# Patient Record
Sex: FEMALE | Race: WHITE | NOT HISPANIC OR LATINO | URBAN - METROPOLITAN AREA
[De-identification: names, ages, dates, MRNs, and addresses within clinical notes are randomized per-mention and may not be internally consistent; named-entity substitution may affect disease eponyms.]

---

## 2018-07-04 ENCOUNTER — EMERGENCY (EMERGENCY)
Facility: HOSPITAL | Age: 68
LOS: 0 days | Discharge: HOME | End: 2018-07-04
Attending: EMERGENCY MEDICINE | Admitting: EMERGENCY MEDICINE

## 2018-07-04 VITALS
WEIGHT: 138.01 LBS | HEART RATE: 62 BPM | HEIGHT: 61 IN | OXYGEN SATURATION: 100 % | SYSTOLIC BLOOD PRESSURE: 179 MMHG | TEMPERATURE: 97 F | RESPIRATION RATE: 18 BRPM | DIASTOLIC BLOOD PRESSURE: 80 MMHG

## 2018-07-04 VITALS
OXYGEN SATURATION: 100 % | DIASTOLIC BLOOD PRESSURE: 84 MMHG | HEART RATE: 69 BPM | RESPIRATION RATE: 18 BRPM | TEMPERATURE: 96 F | SYSTOLIC BLOOD PRESSURE: 148 MMHG

## 2018-07-04 DIAGNOSIS — Z98.890 OTHER SPECIFIED POSTPROCEDURAL STATES: Chronic | ICD-10-CM

## 2018-07-04 DIAGNOSIS — N39.0 URINARY TRACT INFECTION, SITE NOT SPECIFIED: ICD-10-CM

## 2018-07-04 DIAGNOSIS — F41.9 ANXIETY DISORDER, UNSPECIFIED: ICD-10-CM

## 2018-07-04 DIAGNOSIS — Z79.899 OTHER LONG TERM (CURRENT) DRUG THERAPY: ICD-10-CM

## 2018-07-04 DIAGNOSIS — R42 DIZZINESS AND GIDDINESS: ICD-10-CM

## 2018-07-04 DIAGNOSIS — R53.83 OTHER FATIGUE: ICD-10-CM

## 2018-07-04 LAB
ALBUMIN SERPL ELPH-MCNC: 4.4 G/DL — SIGNIFICANT CHANGE UP (ref 3.5–5.2)
ALP SERPL-CCNC: 91 U/L — SIGNIFICANT CHANGE UP (ref 30–115)
ALT FLD-CCNC: 20 U/L — SIGNIFICANT CHANGE UP (ref 0–41)
ANION GAP SERPL CALC-SCNC: 15 MMOL/L — HIGH (ref 7–14)
APPEARANCE UR: CLEAR — SIGNIFICANT CHANGE UP
AST SERPL-CCNC: 32 U/L — SIGNIFICANT CHANGE UP (ref 0–41)
BACTERIA # UR AUTO: (no result)
BASOPHILS # BLD AUTO: 0.06 K/UL — SIGNIFICANT CHANGE UP (ref 0–0.2)
BASOPHILS NFR BLD AUTO: 0.4 % — SIGNIFICANT CHANGE UP (ref 0–1)
BILIRUB SERPL-MCNC: 0.4 MG/DL — SIGNIFICANT CHANGE UP (ref 0.2–1.2)
BILIRUB UR-MCNC: NEGATIVE — SIGNIFICANT CHANGE UP
BUN SERPL-MCNC: 13 MG/DL — SIGNIFICANT CHANGE UP (ref 10–20)
CALCIUM SERPL-MCNC: 9.8 MG/DL — SIGNIFICANT CHANGE UP (ref 8.5–10.1)
CHLORIDE SERPL-SCNC: 103 MMOL/L — SIGNIFICANT CHANGE UP (ref 98–110)
CK SERPL-CCNC: 107 U/L — SIGNIFICANT CHANGE UP (ref 0–225)
CO2 SERPL-SCNC: 25 MMOL/L — SIGNIFICANT CHANGE UP (ref 17–32)
COLOR SPEC: YELLOW — SIGNIFICANT CHANGE UP
CREAT SERPL-MCNC: 0.8 MG/DL — SIGNIFICANT CHANGE UP (ref 0.7–1.5)
DIFF PNL FLD: (no result)
EOSINOPHIL # BLD AUTO: 0.05 K/UL — SIGNIFICANT CHANGE UP (ref 0–0.7)
EOSINOPHIL NFR BLD AUTO: 0.4 % — SIGNIFICANT CHANGE UP (ref 0–8)
GLUCOSE SERPL-MCNC: 120 MG/DL — HIGH (ref 70–99)
GLUCOSE UR QL: NEGATIVE MG/DL — SIGNIFICANT CHANGE UP
HCT VFR BLD CALC: 40.1 % — SIGNIFICANT CHANGE UP (ref 37–47)
HGB BLD-MCNC: 13.2 G/DL — SIGNIFICANT CHANGE UP (ref 12–16)
IMM GRANULOCYTES NFR BLD AUTO: 0.5 % — HIGH (ref 0.1–0.3)
KETONES UR-MCNC: NEGATIVE — SIGNIFICANT CHANGE UP
LACTATE SERPL-SCNC: 1.8 MMOL/L — SIGNIFICANT CHANGE UP (ref 0.5–2.2)
LEUKOCYTE ESTERASE UR-ACNC: SIGNIFICANT CHANGE UP
LYMPHOCYTES # BLD AUTO: 1.79 K/UL — SIGNIFICANT CHANGE UP (ref 1.2–3.4)
LYMPHOCYTES # BLD AUTO: 12.7 % — LOW (ref 20.5–51.1)
MAGNESIUM SERPL-MCNC: 1.9 MG/DL — SIGNIFICANT CHANGE UP (ref 1.8–2.4)
MCHC RBC-ENTMCNC: 30.1 PG — SIGNIFICANT CHANGE UP (ref 27–31)
MCHC RBC-ENTMCNC: 32.9 G/DL — SIGNIFICANT CHANGE UP (ref 32–37)
MCV RBC AUTO: 91.6 FL — SIGNIFICANT CHANGE UP (ref 81–99)
MONOCYTES # BLD AUTO: 0.76 K/UL — HIGH (ref 0.1–0.6)
MONOCYTES NFR BLD AUTO: 5.4 % — SIGNIFICANT CHANGE UP (ref 1.7–9.3)
NEUTROPHILS # BLD AUTO: 11.4 K/UL — HIGH (ref 1.4–6.5)
NEUTROPHILS NFR BLD AUTO: 80.6 % — HIGH (ref 42.2–75.2)
NITRITE UR-MCNC: POSITIVE
NRBC # BLD: 0 /100 WBCS — SIGNIFICANT CHANGE UP (ref 0–0)
PH UR: 6.5 — SIGNIFICANT CHANGE UP (ref 5–8)
PLATELET # BLD AUTO: 281 K/UL — SIGNIFICANT CHANGE UP (ref 130–400)
POTASSIUM SERPL-MCNC: 4.9 MMOL/L — SIGNIFICANT CHANGE UP (ref 3.5–5)
POTASSIUM SERPL-SCNC: 4.9 MMOL/L — SIGNIFICANT CHANGE UP (ref 3.5–5)
PROT SERPL-MCNC: 7.5 G/DL — SIGNIFICANT CHANGE UP (ref 6–8)
PROT UR-MCNC: NEGATIVE MG/DL — SIGNIFICANT CHANGE UP
RBC # BLD: 4.38 M/UL — SIGNIFICANT CHANGE UP (ref 4.2–5.4)
RBC # FLD: 11.9 % — SIGNIFICANT CHANGE UP (ref 11.5–14.5)
RBC CASTS # UR COMP ASSIST: (no result) /HPF
SODIUM SERPL-SCNC: 143 MMOL/L — SIGNIFICANT CHANGE UP (ref 135–146)
SP GR SPEC: 1.01 — SIGNIFICANT CHANGE UP (ref 1.01–1.03)
TROPONIN T SERPL-MCNC: <0.01 NG/ML — SIGNIFICANT CHANGE UP
UROBILINOGEN FLD QL: 0.2 MG/DL — SIGNIFICANT CHANGE UP (ref 0.2–0.2)
WBC # BLD: 14.13 K/UL — HIGH (ref 4.8–10.8)
WBC # FLD AUTO: 14.13 K/UL — HIGH (ref 4.8–10.8)
WBC UR QL: (no result) /HPF

## 2018-07-04 RX ORDER — CEFTRIAXONE 500 MG/1
1 INJECTION, POWDER, FOR SOLUTION INTRAMUSCULAR; INTRAVENOUS ONCE
Qty: 0 | Refills: 0 | Status: COMPLETED | OUTPATIENT
Start: 2018-07-04 | End: 2018-07-04

## 2018-07-04 RX ORDER — CEFPODOXIME PROXETIL 100 MG
1 TABLET ORAL
Qty: 14 | Refills: 0
Start: 2018-07-04 | End: 2018-07-10

## 2018-07-04 RX ADMIN — CEFTRIAXONE 100 GRAM(S): 500 INJECTION, POWDER, FOR SOLUTION INTRAMUSCULAR; INTRAVENOUS at 15:00

## 2018-07-04 NOTE — ED PROVIDER NOTE - OBJECTIVE STATEMENT
fatigue, tired, lightheaded since yesterday. C/o urinary pressure and urgency, No fever, no HA, no chest pain, no SOB

## 2018-07-04 NOTE — ED PROVIDER NOTE - CARE PLAN
Principal Discharge DX:	UTI (urinary tract infection) Principal Discharge DX:	UTI (urinary tract infection)  Secondary Diagnosis:	Lightheadedness

## 2018-07-04 NOTE — ED PROVIDER NOTE - ATTENDING CONTRIBUTION TO CARE
67yr hx  of anxiety here for eval of anxiety. the pt fatigue, tired, and a lightheaded since yesterday. associated with urinary frquency,. no back pain, fever, chills. pt reports that she is walking fine, but occasionally when she gets up feels worse. CON: ao x 3, HENMT: clear oropharynx,  neck supple,  CV: rrr, equal pulses b/l, RESP: cta b/l, GI:  soft, nontender, no rebound, no guarding, SKIN: no rash, MSK: no deformities, no cva ttp.  NEURO: awake alert, sitting up in bed smiling upon approach, neck supple, perrla eomi, cn 2-12 intact, motor 5/5 X4, sensation grossly intact. normal finger to nose, normal rapid alt mvt, normal gait.  cit Psychiatric: appropriate mood, appropriate affect  impression, fatigue, tired, lightheaded, urinary sx's. pt with normal exam will tx for uti

## 2018-07-05 LAB
CULTURE RESULTS: NO GROWTH — SIGNIFICANT CHANGE UP
SPECIMEN SOURCE: SIGNIFICANT CHANGE UP

## 2018-10-10 PROBLEM — C50.919 MALIGNANT NEOPLASM OF UNSPECIFIED SITE OF UNSPECIFIED FEMALE BREAST: Chronic | Status: ACTIVE | Noted: 2018-07-04

## 2018-10-10 PROBLEM — F41.9 ANXIETY DISORDER, UNSPECIFIED: Chronic | Status: ACTIVE | Noted: 2018-07-04

## 2021-01-05 ENCOUNTER — OUTPATIENT (OUTPATIENT)
Dept: OUTPATIENT SERVICES | Facility: HOSPITAL | Age: 71
LOS: 1 days | Discharge: HOME | End: 2021-01-05
Payer: MEDICARE

## 2021-01-05 VITALS
SYSTOLIC BLOOD PRESSURE: 160 MMHG | OXYGEN SATURATION: 99 % | RESPIRATION RATE: 16 BRPM | WEIGHT: 141.1 LBS | TEMPERATURE: 97 F | HEIGHT: 62 IN | DIASTOLIC BLOOD PRESSURE: 80 MMHG | HEART RATE: 98 BPM

## 2021-01-05 DIAGNOSIS — Z98.890 OTHER SPECIFIED POSTPROCEDURAL STATES: Chronic | ICD-10-CM

## 2021-01-05 DIAGNOSIS — M16.12 UNILATERAL PRIMARY OSTEOARTHRITIS, LEFT HIP: ICD-10-CM

## 2021-01-05 DIAGNOSIS — Z01.818 ENCOUNTER FOR OTHER PREPROCEDURAL EXAMINATION: ICD-10-CM

## 2021-01-05 LAB
A1C WITH ESTIMATED AVERAGE GLUCOSE RESULT: 5.4 % — SIGNIFICANT CHANGE UP (ref 4–5.6)
ALBUMIN SERPL ELPH-MCNC: 4.7 G/DL — SIGNIFICANT CHANGE UP (ref 3.5–5.2)
ALP SERPL-CCNC: 100 U/L — SIGNIFICANT CHANGE UP (ref 30–115)
ALT FLD-CCNC: 17 U/L — SIGNIFICANT CHANGE UP (ref 0–41)
ANION GAP SERPL CALC-SCNC: 14 MMOL/L — SIGNIFICANT CHANGE UP (ref 7–14)
APTT BLD: 33.7 SEC — SIGNIFICANT CHANGE UP (ref 27–39.2)
AST SERPL-CCNC: 21 U/L — SIGNIFICANT CHANGE UP (ref 0–41)
BASOPHILS # BLD AUTO: 0.06 K/UL — SIGNIFICANT CHANGE UP (ref 0–0.2)
BASOPHILS NFR BLD AUTO: 0.7 % — SIGNIFICANT CHANGE UP (ref 0–1)
BILIRUB SERPL-MCNC: 0.2 MG/DL — SIGNIFICANT CHANGE UP (ref 0.2–1.2)
BUN SERPL-MCNC: 18 MG/DL — SIGNIFICANT CHANGE UP (ref 10–20)
CALCIUM SERPL-MCNC: 9.5 MG/DL — SIGNIFICANT CHANGE UP (ref 8.5–10.1)
CHLORIDE SERPL-SCNC: 102 MMOL/L — SIGNIFICANT CHANGE UP (ref 98–110)
CO2 SERPL-SCNC: 23 MMOL/L — SIGNIFICANT CHANGE UP (ref 17–32)
CREAT SERPL-MCNC: 0.6 MG/DL — LOW (ref 0.7–1.5)
EOSINOPHIL # BLD AUTO: 0.02 K/UL — SIGNIFICANT CHANGE UP (ref 0–0.7)
EOSINOPHIL NFR BLD AUTO: 0.2 % — SIGNIFICANT CHANGE UP (ref 0–8)
ESTIMATED AVERAGE GLUCOSE: 108 MG/DL — SIGNIFICANT CHANGE UP (ref 68–114)
GLUCOSE SERPL-MCNC: 94 MG/DL — SIGNIFICANT CHANGE UP (ref 70–99)
HCT VFR BLD CALC: 42.7 % — SIGNIFICANT CHANGE UP (ref 37–47)
HGB BLD-MCNC: 13.5 G/DL — SIGNIFICANT CHANGE UP (ref 12–16)
IMM GRANULOCYTES NFR BLD AUTO: 0.4 % — HIGH (ref 0.1–0.3)
INR BLD: 0.85 RATIO — SIGNIFICANT CHANGE UP (ref 0.65–1.3)
LYMPHOCYTES # BLD AUTO: 1.69 K/UL — SIGNIFICANT CHANGE UP (ref 1.2–3.4)
LYMPHOCYTES # BLD AUTO: 20.5 % — SIGNIFICANT CHANGE UP (ref 20.5–51.1)
MCHC RBC-ENTMCNC: 29.9 PG — SIGNIFICANT CHANGE UP (ref 27–31)
MCHC RBC-ENTMCNC: 31.6 G/DL — LOW (ref 32–37)
MCV RBC AUTO: 94.7 FL — SIGNIFICANT CHANGE UP (ref 81–99)
MONOCYTES # BLD AUTO: 0.45 K/UL — SIGNIFICANT CHANGE UP (ref 0.1–0.6)
MONOCYTES NFR BLD AUTO: 5.5 % — SIGNIFICANT CHANGE UP (ref 1.7–9.3)
MRSA PCR RESULT.: NEGATIVE — SIGNIFICANT CHANGE UP
NEUTROPHILS # BLD AUTO: 5.99 K/UL — SIGNIFICANT CHANGE UP (ref 1.4–6.5)
NEUTROPHILS NFR BLD AUTO: 72.7 % — SIGNIFICANT CHANGE UP (ref 42.2–75.2)
NRBC # BLD: 0 /100 WBCS — SIGNIFICANT CHANGE UP (ref 0–0)
PLATELET # BLD AUTO: 321 K/UL — SIGNIFICANT CHANGE UP (ref 130–400)
POTASSIUM SERPL-MCNC: 4.1 MMOL/L — SIGNIFICANT CHANGE UP (ref 3.5–5)
POTASSIUM SERPL-SCNC: 4.1 MMOL/L — SIGNIFICANT CHANGE UP (ref 3.5–5)
PROT SERPL-MCNC: 7.5 G/DL — SIGNIFICANT CHANGE UP (ref 6–8)
PROTHROM AB SERPL-ACNC: 9.8 SEC — LOW (ref 9.95–12.87)
RBC # BLD: 4.51 M/UL — SIGNIFICANT CHANGE UP (ref 4.2–5.4)
RBC # FLD: 12 % — SIGNIFICANT CHANGE UP (ref 11.5–14.5)
SODIUM SERPL-SCNC: 139 MMOL/L — SIGNIFICANT CHANGE UP (ref 135–146)
WBC # BLD: 8.24 K/UL — SIGNIFICANT CHANGE UP (ref 4.8–10.8)
WBC # FLD AUTO: 8.24 K/UL — SIGNIFICANT CHANGE UP (ref 4.8–10.8)

## 2021-01-05 PROCEDURE — 71046 X-RAY EXAM CHEST 2 VIEWS: CPT | Mod: 26

## 2021-01-05 PROCEDURE — 73502 X-RAY EXAM HIP UNI 2-3 VIEWS: CPT | Mod: 26,LT

## 2021-01-05 PROCEDURE — 93010 ELECTROCARDIOGRAM REPORT: CPT

## 2021-01-05 NOTE — H&P PST ADULT - HISTORY OF PRESENT ILLNESS
Pt complains of chronic left hip pain for a year. As per pt pain is intermittent, described as no pain at rest and 10/10 with ambulation. Motrin PRN for pain with maximum relief. Pain is described as sharp and "bone on none"   Denies any chest pain, difficulty breathing, SOB, palpitations, dysuria, URI, or any other infections in the last 2 weeks/1 month. Denies any recent travel, contact, or exposure to any persons with known or suspected COVID-19. Pt also denies COVID testing within the last 2 weeks. Pt advised to self quarantine until day of procedure. Exercise tolerance of 2-3 flights of stairs without dyspnea. LUZ reviewed with patient.    Anesthesia Alert  NO--Difficult Airway  NO--History of neck surgery or radiation  NO--Limited ROM of neck  NO--History of Malignant hyperthermia  NO--Personal or family history of Pseudocholinesterase deficiency  YES--Prior Anesthesia Complication, nausea  NO--Latex Allergy  NO--Loose teeth  NO--History of Rheumatoid Arthritis  NO--LUZ  NO--Other_____

## 2021-01-05 NOTE — H&P PST ADULT - GASTROINTESTINAL DETAILS
soft/nontender/bowel sounds normal/no bruit/no rebound tenderness/no guarding/no rigidity/no organomegaly

## 2021-01-22 ENCOUNTER — OUTPATIENT (OUTPATIENT)
Dept: OUTPATIENT SERVICES | Facility: HOSPITAL | Age: 71
LOS: 1 days | Discharge: HOME | End: 2021-01-22

## 2021-01-22 DIAGNOSIS — Z11.59 ENCOUNTER FOR SCREENING FOR OTHER VIRAL DISEASES: ICD-10-CM

## 2021-01-22 DIAGNOSIS — Z98.890 OTHER SPECIFIED POSTPROCEDURAL STATES: Chronic | ICD-10-CM

## 2021-01-22 PROBLEM — M25.552 PAIN IN LEFT HIP: Chronic | Status: ACTIVE | Noted: 2021-01-05

## 2021-01-22 NOTE — ASU PATIENT PROFILE, ADULT - DOMESTIC TRAVEL HIGH RISK QUESTION
[facility]  Plastic Surgery  Progress Note    Subjective:     History of Present Illness:  Maria Elena Tavares is a 65F with of breast cancer (completed chemo 02/2020, last radiation 07/2020), T2DM, HTN, anemia of neoplastic disease who is admitted for gastritis. Pt s/p B/L mastectomy and TE reconstruction on 3/30/2020, completed chemo in 2/2020, and recently completed radiation this month. Pt now presenting w/ purulent drainage from left breast wound.     Taken to OR on 7/30 for explant of bilateral tissue expanders.    Post-Op Info:  Procedure(s) (LRB):  REMOVAL, TISSUE EXPANDER (Bilateral)   1 Day Post-Op     Interval History: Patient states that she is doing well this am. She states that she is having some incisional pain to the left breast, but that her pain is well controlled on prn pain medication. Denies all other complaints this am.     Medications:  Continuous Infusions:  Scheduled Meds:   aspirin  81 mg Oral Daily    cefTRIAXone (ROCEPHIN) IVPB  2 g Intravenous Q24H    dicyclomine  20 mg Oral QID (AC & HS)    famotidine  20 mg Oral BID    gabapentin  200 mg Oral TID    irbesartan  300 mg Oral Daily    letrozole  2.5 mg Oral Daily    levothyroxine  200 mcg Oral Daily    polyethylene glycol  17 g Oral BID    vancomycin (VANCOCIN) IVPB  2,000 mg Intravenous Q24H     PRN Meds:acetaminophen, albuterol-ipratropium, dextrose 50%, dextrose 50%, glucagon (human recombinant), glucose, glucose, insulin aspart U-100, melatonin, morphine, naloxone, ondansetron, oxyCODONE, oxyCODONE, promethazine (PHENERGAN) IVPB, simethicone, sodium chloride 0.9%, sodium chloride 0.9%, sodium chloride 0.9%     Review of patient's allergies indicates:  No Known Allergies  Objective:     Vital Signs (Most Recent):  Temp: 96.9 °F (36.1 °C) (07/31/20 0559)  Pulse: 85 (07/31/20 0559)  Resp: 18 (07/31/20 0647)  BP: 103/60 (07/31/20 0559)  SpO2: 99 % (07/31/20 0559) Vital Signs (24h Range):  Temp:  [96.5 °F (35.8  °C)-98.9 °F (37.2 °C)] 96.9 °F (36.1 °C)  Pulse:  [] 85  Resp:  [11-19] 18  SpO2:  [94 %-100 %] 99 %  BP: (103-138)/(56-84) 103/60     Weight: 78.8 kg (173 lb 13.3 oz)  Body mass index is 33.95 kg/m².    Intake/Output - Last 3 Shifts       07/29 0700 - 07/30 0659 07/30 0700 - 07/31 0659 07/31 0700 - 08/01 0659    P.O. 950 570     I.V. (mL/kg)  500 (6.3)     Total Intake(mL/kg) 950 (12.1) 1070 (13.6)     Urine (mL/kg/hr)  350 (0.2)     Drains  45     Stool  0     Total Output  395     Net +950 +675            Urine Occurrence 1 x 1 x     Stool Occurrence 3 x 0 x           Physical Exam  Constitutional:       General: She is not in acute distress.     Appearance: Normal appearance.   HENT:      Head: Normocephalic and atraumatic.      Right Ear: External ear normal.      Left Ear: External ear normal.      Nose: Nose normal.      Mouth/Throat:      Mouth: Mucous membranes are moist.      Pharynx: Oropharynx is clear.   Eyes:      Extraocular Movements: Extraocular movements intact.      Pupils: Pupils are equal, round, and reactive to light.   Neck:      Musculoskeletal: Normal range of motion.   Cardiovascular:      Rate and Rhythm: Normal rate.      Pulses: Normal pulses.   Pulmonary:      Effort: Pulmonary effort is normal. No respiratory distress.   Abdominal:      General: Abdomen is flat.      Palpations: Abdomen is soft.   Musculoskeletal:      Right lower leg: No edema.      Left lower leg: No edema.   Skin:     General: Skin is warm and dry.      Comments: R breast inframammary incisions are c/d/i. R breast drain with minimal SS output. L breast inframammary incisions c/d/i, minimal SS output from L breast drain.    Neurological:      General: No focal deficit present.      Mental Status: She is alert and oriented to person, place, and time.   Psychiatric:         Mood and Affect: Mood normal.         Behavior: Behavior normal.         Significant Labs:  CBC:   Recent Labs   Lab 07/31/20  0502   WBC  14.68*   RBC 2.99*   HGB 8.6*   HCT 28.1*   *   MCV 94   MCH 28.8   MCHC 30.6*     BMP:   Recent Labs   Lab 07/31/20  0502   *   *   K 4.6   CL 99   CO2 27   BUN 12   CREATININE 1.0   CALCIUM 9.2   MG 1.9     CMP:   Recent Labs   Lab 07/27/20  1511  07/31/20  0502   *   < > 214*   CALCIUM 9.5   < > 9.2   ALBUMIN 2.9*  --   --    PROT 7.8  --   --       < > 134*   K 3.1*   < > 4.6   CO2 28   < > 27   CL 98   < > 99   BUN 18   < > 12   CREATININE 0.9   < > 1.0   ALKPHOS 82  --   --    ALT 10  --   --    AST 18  --   --    BILITOT 0.8  --   --     < > = values in this interval not displayed.       Significant Diagnostics:  I have reviewed all pertinent imaging results/findings within the past 24 hours.    Assessment/Plan:     Breast cancer  Maria Elena Tavares is a 65F with of breast cancer (completed chemo 02/2020, last radiation 07/2020), T2DM, HTN, anemia of neoplastic disease, s/p B/L mastectomy and TE reconstruction on 3/30/2020, now s/p explant of bilateral tissue expanders for infected left breast expander.  - OR on 7/30 for explant, patient tolerated procedure well  - diabetic diet   - home meds restarted  - patient to follow up in clinic for drain removal  - c/w IV antibiotics   - will speak to primary team regarding dispo        Ricky Roger MD  Plastic Surgery  [facility]   No

## 2021-01-25 ENCOUNTER — INPATIENT (INPATIENT)
Facility: HOSPITAL | Age: 71
LOS: 0 days | Discharge: HOME HEALTH | End: 2021-01-26
Attending: ORTHOPAEDIC SURGERY | Admitting: ORTHOPAEDIC SURGERY
Payer: MEDICARE

## 2021-01-25 ENCOUNTER — RESULT REVIEW (OUTPATIENT)
Age: 71
End: 2021-01-25

## 2021-01-25 VITALS
DIASTOLIC BLOOD PRESSURE: 68 MMHG | TEMPERATURE: 96 F | SYSTOLIC BLOOD PRESSURE: 144 MMHG | HEART RATE: 56 BPM | OXYGEN SATURATION: 96 % | HEIGHT: 61 IN | WEIGHT: 141.98 LBS | RESPIRATION RATE: 17 BRPM

## 2021-01-25 DIAGNOSIS — Z98.890 OTHER SPECIFIED POSTPROCEDURAL STATES: Chronic | ICD-10-CM

## 2021-01-25 DIAGNOSIS — Z90.710 ACQUIRED ABSENCE OF BOTH CERVIX AND UTERUS: Chronic | ICD-10-CM

## 2021-01-25 PROCEDURE — 88311 DECALCIFY TISSUE: CPT | Mod: 26

## 2021-01-25 PROCEDURE — 88304 TISSUE EXAM BY PATHOLOGIST: CPT | Mod: 26

## 2021-01-25 PROCEDURE — 73501 X-RAY EXAM HIP UNI 1 VIEW: CPT | Mod: 26,LT

## 2021-01-25 PROCEDURE — 99232 SBSQ HOSP IP/OBS MODERATE 35: CPT

## 2021-01-25 RX ORDER — ASPIRIN/CALCIUM CARB/MAGNESIUM 324 MG
81 TABLET ORAL
Refills: 0 | Status: DISCONTINUED | OUTPATIENT
Start: 2021-01-25 | End: 2021-01-26

## 2021-01-25 RX ORDER — TRAMADOL HYDROCHLORIDE 50 MG/1
50 TABLET ORAL EVERY 6 HOURS
Refills: 0 | Status: DISCONTINUED | OUTPATIENT
Start: 2021-01-25 | End: 2021-01-26

## 2021-01-25 RX ORDER — HYDROMORPHONE HYDROCHLORIDE 2 MG/ML
0.5 INJECTION INTRAMUSCULAR; INTRAVENOUS; SUBCUTANEOUS
Refills: 0 | Status: DISCONTINUED | OUTPATIENT
Start: 2021-01-25 | End: 2021-01-25

## 2021-01-25 RX ORDER — CEFAZOLIN SODIUM 1 G
2000 VIAL (EA) INJECTION EVERY 8 HOURS
Refills: 0 | Status: COMPLETED | OUTPATIENT
Start: 2021-01-25 | End: 2021-01-25

## 2021-01-25 RX ORDER — ALPRAZOLAM 0.25 MG
0.5 TABLET ORAL
Refills: 0 | Status: DISCONTINUED | OUTPATIENT
Start: 2021-01-25 | End: 2021-01-26

## 2021-01-25 RX ORDER — SODIUM CHLORIDE 9 MG/ML
1000 INJECTION INTRAMUSCULAR; INTRAVENOUS; SUBCUTANEOUS
Refills: 0 | Status: DISCONTINUED | OUTPATIENT
Start: 2021-01-26 | End: 2021-01-26

## 2021-01-25 RX ORDER — CHLORHEXIDINE GLUCONATE 213 G/1000ML
1 SOLUTION TOPICAL
Refills: 0 | Status: DISCONTINUED | OUTPATIENT
Start: 2021-01-25 | End: 2021-01-26

## 2021-01-25 RX ORDER — MAGNESIUM HYDROXIDE 400 MG/1
30 TABLET, CHEWABLE ORAL DAILY
Refills: 0 | Status: DISCONTINUED | OUTPATIENT
Start: 2021-01-25 | End: 2021-01-26

## 2021-01-25 RX ORDER — CELECOXIB 200 MG/1
400 CAPSULE ORAL ONCE
Refills: 0 | Status: COMPLETED | OUTPATIENT
Start: 2021-01-25 | End: 2021-01-25

## 2021-01-25 RX ORDER — METOPROLOL TARTRATE 50 MG
25 TABLET ORAL DAILY
Refills: 0 | Status: DISCONTINUED | OUTPATIENT
Start: 2021-01-25 | End: 2021-01-26

## 2021-01-25 RX ORDER — CELECOXIB 200 MG/1
200 CAPSULE ORAL EVERY 12 HOURS
Refills: 0 | Status: DISCONTINUED | OUTPATIENT
Start: 2021-01-26 | End: 2021-01-26

## 2021-01-25 RX ORDER — SODIUM CHLORIDE 9 MG/ML
1000 INJECTION, SOLUTION INTRAVENOUS
Refills: 0 | Status: DISCONTINUED | OUTPATIENT
Start: 2021-01-25 | End: 2021-01-25

## 2021-01-25 RX ORDER — ONDANSETRON 8 MG/1
4 TABLET, FILM COATED ORAL ONCE
Refills: 0 | Status: DISCONTINUED | OUTPATIENT
Start: 2021-01-25 | End: 2021-01-25

## 2021-01-25 RX ORDER — ACETAMINOPHEN 500 MG
650 TABLET ORAL EVERY 6 HOURS
Refills: 0 | Status: DISCONTINUED | OUTPATIENT
Start: 2021-01-25 | End: 2021-01-26

## 2021-01-25 RX ORDER — ONDANSETRON 8 MG/1
4 TABLET, FILM COATED ORAL EVERY 6 HOURS
Refills: 0 | Status: DISCONTINUED | OUTPATIENT
Start: 2021-01-25 | End: 2021-01-26

## 2021-01-25 RX ORDER — SENNA PLUS 8.6 MG/1
2 TABLET ORAL AT BEDTIME
Refills: 0 | Status: DISCONTINUED | OUTPATIENT
Start: 2021-01-25 | End: 2021-01-26

## 2021-01-25 RX ORDER — ACETAMINOPHEN 500 MG
1000 TABLET ORAL ONCE
Refills: 0 | Status: COMPLETED | OUTPATIENT
Start: 2021-01-25 | End: 2021-01-25

## 2021-01-25 RX ORDER — PANTOPRAZOLE SODIUM 20 MG/1
40 TABLET, DELAYED RELEASE ORAL
Refills: 0 | Status: DISCONTINUED | OUTPATIENT
Start: 2021-01-25 | End: 2021-01-26

## 2021-01-25 RX ORDER — KETOROLAC TROMETHAMINE 30 MG/ML
15 SYRINGE (ML) INJECTION EVERY 6 HOURS
Refills: 0 | Status: DISCONTINUED | OUTPATIENT
Start: 2021-01-25 | End: 2021-01-26

## 2021-01-25 RX ADMIN — Medication 1000 MILLIGRAM(S): at 06:24

## 2021-01-25 RX ADMIN — Medication 15 MILLIGRAM(S): at 17:08

## 2021-01-25 RX ADMIN — Medication 100 MILLIGRAM(S): at 23:39

## 2021-01-25 RX ADMIN — SENNA PLUS 2 TABLET(S): 8.6 TABLET ORAL at 21:35

## 2021-01-25 RX ADMIN — Medication 650 MILLIGRAM(S): at 23:32

## 2021-01-25 RX ADMIN — CELECOXIB 400 MILLIGRAM(S): 200 CAPSULE ORAL at 06:24

## 2021-01-25 RX ADMIN — Medication 81 MILLIGRAM(S): at 17:09

## 2021-01-25 RX ADMIN — Medication 15 MILLIGRAM(S): at 11:58

## 2021-01-25 RX ADMIN — Medication 15 MILLIGRAM(S): at 23:32

## 2021-01-25 RX ADMIN — SODIUM CHLORIDE 75 MILLILITER(S): 9 INJECTION, SOLUTION INTRAVENOUS at 11:59

## 2021-01-25 RX ADMIN — Medication 650 MILLIGRAM(S): at 17:08

## 2021-01-25 RX ADMIN — Medication 100 MILLIGRAM(S): at 17:08

## 2021-01-25 RX ADMIN — Medication 650 MILLIGRAM(S): at 11:54

## 2021-01-25 NOTE — CHART NOTE - NSCHARTNOTEFT_GEN_A_CORE
PACU ANESTHESIA ADMISSION NOTE      Procedure:   Post op diagnosis:      ____  Intubated  TV:______       Rate: ______      FiO2: ______    __x__  Patent Airway    __x__  Full return of protective reflexes    ____  Full recovery from anesthesia / back to baseline     Vitals:   T:    97.9       R:      18            BP:      93/46            Sat:       98            P:   66      Mental Status:  __x__ Awake   _____ Alert   _____ Drowsy   _____ Sedated    Nausea/Vomiting:  __x__ NO  ______Yes,   See Post - Op Orders          Pain Scale (0-10):  __0___    Treatment: ____ None    ____ See Post - Op/PCA Orders    Post - Operative Fluids:   __x__ Oral   ____ See Post - Op Orders    Plan: Discharge:   ____Home       ___x__Floor     _____Critical Care    _____  Other:_________________    Comments:

## 2021-01-25 NOTE — PHYSICAL THERAPY INITIAL EVALUATION ADULT - GENERAL OBSERVATIONS, REHAB EVAL
14:35 - 15:00. Chart reviewed. Patient available to be seen for physical therapy, confirmed with nurse. Patient encountered semi-reclined in bed, +aquacel dressing (L) hip. Denies pain at rest and is ready to walk with PT now.

## 2021-01-25 NOTE — PHYSICAL THERAPY INITIAL EVALUATION ADULT - RANGE OF MOTION EXAMINATION, REHAB EVAL
(L) hip flexion 0 - 90 degrees, abduction 0-35 degrees , rest LLE and RLE WFL grossly throughout, assessed in semi-reclined position

## 2021-01-25 NOTE — ASU PREOP CHECKLIST - NS PREOP CHK MONITOR ANESTHESIA CONSENT
Rl,MD filled for 30 days only but with no notes     Please advise on if you would like to refill or need to see pt     Thank you     Kari Mcleod RN, BSN  [facility]        done

## 2021-01-25 NOTE — PHYSICAL THERAPY INITIAL EVALUATION ADULT - ADDITIONAL COMMENTS
As per patient, ambulated independently prior to this surgery, but with increased pain.  Resides in "mother-daughter" house , patient lives alone downstairs, daughter resides upstairs.  No stairs, ramp to enter.

## 2021-01-25 NOTE — PHYSICAL THERAPY INITIAL EVALUATION ADULT - GAIT DEVIATIONS NOTED, PT EVAL
stooped posture, decreased heel strike / push off/decreased maría/increased time in double stance/decreased step length/decreased weight-shifting ability

## 2021-01-25 NOTE — CONSULT NOTE ADULT - SUBJECTIVE AND OBJECTIVE BOX
70 F with a pmhx of hypertension presents s/p left bruno pod 0.   Patient currently denies any pain .   no other complaints, walked with PT, urinated postop.     pmhx:   Hypertension    allergies:   nkda    social :  no smoking no drinking     Med:   metoprolol 25mg oral daily.    REVIEW OF SYSTEMS:  General: no weakness, no fever/chills, no weight loss/gain  Skin/Breast: no rash, no jaundice  Ophthalmologic: no vision changes, no dry eyes   Respiratory and Thorax: no cough, no wheezing, no hemoptysis, no dyspnea  Cardiovascular: no chest pain, no shortness of breath, no orthopnea  Gastrointestinal: no n/v/d, no abdominal pain, no dysphagia   Genitourinary: no dysuria, no frequency, no nocturia, no hematuria  Musculoskeletal: post op  Neurological: no HA, no dizziness, no weakness, no numbness  Psychiatric: no depression, no SI/HI  Hematology/Lymphatics: no easy bruising  Endocrine: no heat or cold intolerance. no weight gain or loss  Allergic/Immunologic: no allergy or recent reaction     Vital Signs Last 24 Hrs  T(C): 36.9 (25 Jan 2021 14:39), Max: 36.9 (25 Jan 2021 14:39)  T(F): 98.5 (25 Jan 2021 14:39), Max: 98.5 (25 Jan 2021 14:39)  HR: 73 (25 Jan 2021 14:39) (56 - 74)  BP: 129/61 (25 Jan 2021 14:39) (92/50 - 144/68)  BP(mean): --  RR: 16 (25 Jan 2021 14:39) (14 - 18)  SpO2: 100% (25 Jan 2021 13:45) (96% - 100%)    GENERAL: NAD, AAOx3  HEAD:  Atraumatic, Normocephalic  EYES: EOMI,  conjunctiva and sclera clear  NECK: Supple, No JVD, No LAD  CHEST/LUNG: Clear bilaterally, No wheeze  HEART: Regular rate and rhythm; No murmurs, rubs, or gallops  ABDOMEN: Soft, Nontender, Nondistended; Bowel sounds present  EXTREMITIES:  +Pulses, No clubbing, cyanosis, dressing d/c/i  compartments soft, incisional edema+    70 yr old female with pmhx of hypertension presents post BRUNO.     1. BRUNO: per ortho  PT/OT, WBAT  pain control  dvt ppx - asa 81 bid 1 month  postop abx  am labs  IS  d/c planning    2. Hypertension:   continue on metoprolol. stable.

## 2021-01-26 ENCOUNTER — TRANSCRIPTION ENCOUNTER (OUTPATIENT)
Age: 71
End: 2021-01-26

## 2021-01-26 VITALS
HEART RATE: 71 BPM | DIASTOLIC BLOOD PRESSURE: 54 MMHG | RESPIRATION RATE: 16 BRPM | TEMPERATURE: 96 F | SYSTOLIC BLOOD PRESSURE: 103 MMHG

## 2021-01-26 LAB
ANION GAP SERPL CALC-SCNC: 8 MMOL/L — SIGNIFICANT CHANGE UP (ref 7–14)
BUN SERPL-MCNC: 12 MG/DL — SIGNIFICANT CHANGE UP (ref 10–20)
CALCIUM SERPL-MCNC: 8.8 MG/DL — SIGNIFICANT CHANGE UP (ref 8.5–10.1)
CHLORIDE SERPL-SCNC: 107 MMOL/L — SIGNIFICANT CHANGE UP (ref 98–110)
CO2 SERPL-SCNC: 24 MMOL/L — SIGNIFICANT CHANGE UP (ref 17–32)
CREAT SERPL-MCNC: 0.6 MG/DL — LOW (ref 0.7–1.5)
GLUCOSE SERPL-MCNC: 97 MG/DL — SIGNIFICANT CHANGE UP (ref 70–99)
HCT VFR BLD CALC: 29.2 % — LOW (ref 37–47)
HGB BLD-MCNC: 9.5 G/DL — LOW (ref 12–16)
MCHC RBC-ENTMCNC: 29.9 PG — SIGNIFICANT CHANGE UP (ref 27–31)
MCHC RBC-ENTMCNC: 32.5 G/DL — SIGNIFICANT CHANGE UP (ref 32–37)
MCV RBC AUTO: 91.8 FL — SIGNIFICANT CHANGE UP (ref 81–99)
NRBC # BLD: 0 /100 WBCS — SIGNIFICANT CHANGE UP (ref 0–0)
PLATELET # BLD AUTO: 226 K/UL — SIGNIFICANT CHANGE UP (ref 130–400)
POTASSIUM SERPL-MCNC: 4.3 MMOL/L — SIGNIFICANT CHANGE UP (ref 3.5–5)
POTASSIUM SERPL-SCNC: 4.3 MMOL/L — SIGNIFICANT CHANGE UP (ref 3.5–5)
RBC # BLD: 3.18 M/UL — LOW (ref 4.2–5.4)
RBC # FLD: 12.6 % — SIGNIFICANT CHANGE UP (ref 11.5–14.5)
SODIUM SERPL-SCNC: 139 MMOL/L — SIGNIFICANT CHANGE UP (ref 135–146)
WBC # BLD: 14.68 K/UL — HIGH (ref 4.8–10.8)
WBC # FLD AUTO: 14.68 K/UL — HIGH (ref 4.8–10.8)

## 2021-01-26 RX ORDER — CELECOXIB 200 MG/1
1 CAPSULE ORAL
Qty: 28 | Refills: 0
Start: 2021-01-26 | End: 2021-02-08

## 2021-01-26 RX ORDER — ASPIRIN/CALCIUM CARB/MAGNESIUM 324 MG
1 TABLET ORAL
Qty: 70 | Refills: 0
Start: 2021-01-26 | End: 2021-03-01

## 2021-01-26 RX ORDER — ACETAMINOPHEN 500 MG
2 TABLET ORAL
Qty: 0 | Refills: 0 | DISCHARGE
Start: 2021-01-26

## 2021-01-26 RX ORDER — ACETAMINOPHEN 500 MG
2 TABLET ORAL
Qty: 0 | Refills: 0 | DISCHARGE

## 2021-01-26 RX ORDER — SENNA PLUS 8.6 MG/1
2 TABLET ORAL
Qty: 0 | Refills: 0 | DISCHARGE
Start: 2021-01-26

## 2021-01-26 RX ORDER — PANTOPRAZOLE SODIUM 20 MG/1
1 TABLET, DELAYED RELEASE ORAL
Qty: 30 | Refills: 1
Start: 2021-01-26 | End: 2021-03-26

## 2021-01-26 RX ORDER — TRAMADOL HYDROCHLORIDE 50 MG/1
1 TABLET ORAL
Qty: 42 | Refills: 0
Start: 2021-01-26 | End: 2021-02-01

## 2021-01-26 RX ADMIN — CELECOXIB 200 MILLIGRAM(S): 200 CAPSULE ORAL at 06:32

## 2021-01-26 RX ADMIN — Medication 81 MILLIGRAM(S): at 06:32

## 2021-01-26 RX ADMIN — Medication 15 MILLIGRAM(S): at 06:32

## 2021-01-26 RX ADMIN — Medication 650 MILLIGRAM(S): at 06:32

## 2021-01-26 RX ADMIN — Medication 650 MILLIGRAM(S): at 11:17

## 2021-01-26 RX ADMIN — PANTOPRAZOLE SODIUM 40 MILLIGRAM(S): 20 TABLET, DELAYED RELEASE ORAL at 06:32

## 2021-01-26 NOTE — PROGRESS NOTE ADULT - SUBJECTIVE AND OBJECTIVE BOX
70 F s/p left joesph pod 0    pt s/e at bedside, comfortable, pain is controlled, no other complaints, walked with PT, urinated postop    NAD    Vital Signs Last 24 Hrs  T(C): 36.9 (25 Jan 2021 14:39), Max: 36.9 (25 Jan 2021 14:39)  T(F): 98.5 (25 Jan 2021 14:39), Max: 98.5 (25 Jan 2021 14:39)  HR: 73 (25 Jan 2021 14:39) (56 - 74)  BP: 129/61 (25 Jan 2021 14:39) (92/50 - 144/68)  BP(mean): --  RR: 16 (25 Jan 2021 14:39) (14 - 18)  SpO2: 100% (25 Jan 2021 13:45) (96% - 100%)    PE:  dressing d/c/i  compartments soft  NVID  sensation intact  calf soft , NT  foot wwp    PT/OT, WBAT  pain control  dvt ppx - asa 81 bid 1 month  postop abx  am labs  IS  d/c planning
ORTHO PROGRESS NOTE       70y Female POD # 1     S/P left Total Hip Arthroplasty     Patient seen and examined at bedside . The patient is awake and alert in NAD. No complaints of chest pain, SOB, N/V.    PAST MEDICAL & SURGICAL HISTORY:  HTN (hypertension)    Left hip pain    Breast cancer    Anxiety    S/P CYNDI-BSO    H/O colonoscopy  2016    Status post left breast lumpectomy          MEDICATIONS  (STANDING):  acetaminophen   Tablet .. 650 milliGRAM(s) Oral every 6 hours  aspirin  chewable 81 milliGRAM(s) Oral two times a day  celecoxib 200 milliGRAM(s) Oral every 12 hours  chlorhexidine 4% Liquid 1 Application(s) Topical <User Schedule>  metoprolol succinate ER 25 milliGRAM(s) Oral daily  pantoprazole    Tablet 40 milliGRAM(s) Oral before breakfast  senna 2 Tablet(s) Oral at bedtime    MEDICATIONS  (PRN):  ALPRAZolam 0.5 milliGRAM(s) Oral two times a day PRN anxiety  magnesium hydroxide Suspension 30 milliLiter(s) Oral daily PRN Constipation  ondansetron Injectable 4 milliGRAM(s) IV Push every 6 hours PRN Nausea and/or Vomiting  traMADol 50 milliGRAM(s) Oral every 6 hours PRN Severe Pain (7 - 10)        Vital Signs Last 24 Hrs  T(C): 35.9 (26 Jan 2021 06:00), Max: 36.9 (25 Jan 2021 14:39)  T(F): 96.6 (26 Jan 2021 06:00), Max: 98.5 (25 Jan 2021 14:39)  HR: 81 (26 Jan 2021 08:32) (58 - 81)  BP: 125/62 (26 Jan 2021 08:32) (92/50 - 130/60)  BP(mean): --  RR: 16 (26 Jan 2021 08:32) (14 - 18)  SpO2: 100% (25 Jan 2021 13:45) (98% - 100%)                          9.5    14.68 )-----------( 226      ( 26 Jan 2021 07:21 )             29.2     01-26    139  |  107  |  12  ----------------------------<  97  4.3   |  24  |  0.6<L>    Ca    8.8      26 Jan 2021 07:21            DVT ppx : aspirin         PE:  The patient was seen and examined at bedside          A&OX3, NAD          dressing C/D/I          Compartments soft         NVI, SILT           A/P:     1.     POD # 1      s/p     left Total Hip Arthroplasty                   OOB to Chair            Physical Therapy- wbat            Pain control - per pain protocol            Incentive Spirometry            DVT Prophylaxis - aspirin bid                      2.      GI ppx- continue Protonix    3.       anxiety- stable, xanax prn    4.       Breast Ca- stable                   Dispo: home with home care

## 2021-01-26 NOTE — DISCHARGE NOTE PROVIDER - NSDCFUADDINST_GEN_ALL_CORE_FT
Keep surgical site clean and dry, may remove dressing in 7  days . Call Dr. Orta if any wound drainage, redness , increasing pain, fevers over 101 or if you have any questions or concerns.     You may shower with the bandage on and once it is removed. Once it is removed  , do not scrub surgical site. Do not apply any lotions/moisturizers/creams to surgical site.    Aspiring 81 mg twice daily for 35 days to lower the risk of lower the risk of lower the risk of lower the risk of dvt.     Call to make your  post op appointment if you do not have one already.

## 2021-01-26 NOTE — DISCHARGE NOTE NURSING/CASE MANAGEMENT/SOCIAL WORK - PATIENT PORTAL LINK FT
You can access the FollowMyHealth Patient Portal offered by [facility] by registering at the following website: [email or web address]. By joining [employer]’s FollowMyHealth portal, you will also be able to view your health information using other applications (apps) compatible with our system.

## 2021-01-26 NOTE — OCCUPATIONAL THERAPY INITIAL EVALUATION ADULT - ADDITIONAL COMMENTS
pt lives alone in apartment with no steps to enter and a walk in shower, with daughter and grandchildren upstairs in separate apartment, owns a raised toilet seat and shower chair

## 2021-01-26 NOTE — DISCHARGE NOTE PROVIDER - NSDCMRMEDTOKEN_GEN_ALL_CORE_FT
acetaminophen 325 mg oral tablet: 2 tab(s) orally every 6 hours x 14 days  aspirin 81 mg oral tablet, chewable: 1 tab(s) orally 2 times a day  celecoxib 200 mg oral capsule: 1 cap(s) orally every 12 hours  metoprolol succinate 25 mg oral capsule, extended release: 1 cap(s) orally once a day  pantoprazole 40 mg oral delayed release tablet: 1 tab(s) orally once a day (before a meal)  senna oral tablet: 2 tab(s) orally once a day (at bedtime)  traMADol 50 mg oral tablet: 1 tab(s) orally every 4 hours, As Needed -Severe Pain (7 - 10) MDD:6 tablets  Xanax:

## 2021-01-26 NOTE — DISCHARGE NOTE PROVIDER - HOSPITAL COURSE
70 year old female with pmh of breast cancer and anxiety, admitted for an elective Total Hip Arthroplasty. There were no intraoperative or post operative complications. The patient is discharged to home with home care services.

## 2021-01-26 NOTE — DISCHARGE NOTE PROVIDER - CARE PROVIDER_API CALL
Alan Quiñonez)  Orthopaedic Surgery  [address] [place] [address]  Phone: [phone]  Fax: [phone]  Follow Up Time:

## 2021-02-01 DIAGNOSIS — M16.12 UNILATERAL PRIMARY OSTEOARTHRITIS, LEFT HIP: ICD-10-CM

## 2021-02-01 DIAGNOSIS — Z85.3 PERSONAL HISTORY OF MALIGNANT NEOPLASM OF BREAST: ICD-10-CM

## 2021-02-01 DIAGNOSIS — I10 ESSENTIAL (PRIMARY) HYPERTENSION: ICD-10-CM

## 2021-02-01 DIAGNOSIS — F41.9 ANXIETY DISORDER, UNSPECIFIED: ICD-10-CM

## 2021-03-23 NOTE — ED PROVIDER NOTE - FAMILY HISTORY
No pertinent family history in first degree relatives Humira Pregnancy And Lactation Text: This medication is Pregnancy Category B and is considered safe during pregnancy. It is unknown if this medication is excreted in breast milk.

## 2022-04-12 ENCOUNTER — APPOINTMENT (OUTPATIENT)
Dept: OPHTHALMOLOGY | Facility: CLINIC | Age: 72
End: 2022-04-12

## 2022-04-12 PROBLEM — Z00.00 ENCOUNTER FOR PREVENTIVE HEALTH EXAMINATION: Status: ACTIVE | Noted: 2022-04-12

## 2022-06-21 ENCOUNTER — RESULT REVIEW (OUTPATIENT)
Age: 72
End: 2022-06-21

## 2022-06-21 ENCOUNTER — OUTPATIENT (OUTPATIENT)
Dept: OUTPATIENT SERVICES | Facility: HOSPITAL | Age: 72
LOS: 1 days | Discharge: HOME | End: 2022-06-21
Payer: MEDICARE

## 2022-06-21 VITALS
OXYGEN SATURATION: 99 % | HEIGHT: 62 IN | TEMPERATURE: 98 F | DIASTOLIC BLOOD PRESSURE: 87 MMHG | SYSTOLIC BLOOD PRESSURE: 127 MMHG | HEART RATE: 68 BPM | RESPIRATION RATE: 15 BRPM | WEIGHT: 150.8 LBS

## 2022-06-21 DIAGNOSIS — M17.11 UNILATERAL PRIMARY OSTEOARTHRITIS, RIGHT KNEE: ICD-10-CM

## 2022-06-21 DIAGNOSIS — Z98.890 OTHER SPECIFIED POSTPROCEDURAL STATES: Chronic | ICD-10-CM

## 2022-06-21 DIAGNOSIS — Z01.818 ENCOUNTER FOR OTHER PREPROCEDURAL EXAMINATION: ICD-10-CM

## 2022-06-21 DIAGNOSIS — Z90.710 ACQUIRED ABSENCE OF BOTH CERVIX AND UTERUS: Chronic | ICD-10-CM

## 2022-06-21 LAB
A1C WITH ESTIMATED AVERAGE GLUCOSE RESULT: 5.3 % — SIGNIFICANT CHANGE UP (ref 4–5.6)
ALBUMIN SERPL ELPH-MCNC: 4.8 G/DL — SIGNIFICANT CHANGE UP (ref 3.5–5.2)
ALP SERPL-CCNC: 111 U/L — SIGNIFICANT CHANGE UP (ref 30–115)
ALT FLD-CCNC: 36 U/L — SIGNIFICANT CHANGE UP (ref 0–41)
ANION GAP SERPL CALC-SCNC: 14 MMOL/L — SIGNIFICANT CHANGE UP (ref 7–14)
APTT BLD: 35.9 SEC — SIGNIFICANT CHANGE UP (ref 27–39.2)
AST SERPL-CCNC: 36 U/L — SIGNIFICANT CHANGE UP (ref 0–41)
BASOPHILS # BLD AUTO: 0.06 K/UL — SIGNIFICANT CHANGE UP (ref 0–0.2)
BASOPHILS NFR BLD AUTO: 0.7 % — SIGNIFICANT CHANGE UP (ref 0–1)
BILIRUB SERPL-MCNC: 0.4 MG/DL — SIGNIFICANT CHANGE UP (ref 0.2–1.2)
BLD GP AB SCN SERPL QL: SIGNIFICANT CHANGE UP
BUN SERPL-MCNC: 14 MG/DL — SIGNIFICANT CHANGE UP (ref 10–20)
CALCIUM SERPL-MCNC: 9.7 MG/DL — SIGNIFICANT CHANGE UP (ref 8.5–10.1)
CHLORIDE SERPL-SCNC: 99 MMOL/L — SIGNIFICANT CHANGE UP (ref 98–110)
CO2 SERPL-SCNC: 25 MMOL/L — SIGNIFICANT CHANGE UP (ref 17–32)
CREAT SERPL-MCNC: 0.7 MG/DL — SIGNIFICANT CHANGE UP (ref 0.7–1.5)
EGFR: 93 ML/MIN/1.73M2 — SIGNIFICANT CHANGE UP
EOSINOPHIL # BLD AUTO: 0.06 K/UL — SIGNIFICANT CHANGE UP (ref 0–0.7)
EOSINOPHIL NFR BLD AUTO: 0.7 % — SIGNIFICANT CHANGE UP (ref 0–8)
ESTIMATED AVERAGE GLUCOSE: 105 MG/DL — SIGNIFICANT CHANGE UP (ref 68–114)
GLUCOSE SERPL-MCNC: 93 MG/DL — SIGNIFICANT CHANGE UP (ref 70–99)
HCT VFR BLD CALC: 39.1 % — SIGNIFICANT CHANGE UP (ref 37–47)
HGB BLD-MCNC: 12.9 G/DL — SIGNIFICANT CHANGE UP (ref 12–16)
IMM GRANULOCYTES NFR BLD AUTO: 0.3 % — SIGNIFICANT CHANGE UP (ref 0.1–0.3)
INR BLD: 0.97 RATIO — SIGNIFICANT CHANGE UP (ref 0.65–1.3)
LYMPHOCYTES # BLD AUTO: 2.41 K/UL — SIGNIFICANT CHANGE UP (ref 1.2–3.4)
LYMPHOCYTES # BLD AUTO: 27.5 % — SIGNIFICANT CHANGE UP (ref 20.5–51.1)
MCHC RBC-ENTMCNC: 30.6 PG — SIGNIFICANT CHANGE UP (ref 27–31)
MCHC RBC-ENTMCNC: 33 G/DL — SIGNIFICANT CHANGE UP (ref 32–37)
MCV RBC AUTO: 92.7 FL — SIGNIFICANT CHANGE UP (ref 81–99)
MONOCYTES # BLD AUTO: 0.59 K/UL — SIGNIFICANT CHANGE UP (ref 0.1–0.6)
MONOCYTES NFR BLD AUTO: 6.7 % — SIGNIFICANT CHANGE UP (ref 1.7–9.3)
MRSA PCR RESULT.: NEGATIVE — SIGNIFICANT CHANGE UP
NEUTROPHILS # BLD AUTO: 5.61 K/UL — SIGNIFICANT CHANGE UP (ref 1.4–6.5)
NEUTROPHILS NFR BLD AUTO: 64.1 % — SIGNIFICANT CHANGE UP (ref 42.2–75.2)
NRBC # BLD: 0 /100 WBCS — SIGNIFICANT CHANGE UP (ref 0–0)
PLATELET # BLD AUTO: 290 K/UL — SIGNIFICANT CHANGE UP (ref 130–400)
POTASSIUM SERPL-MCNC: 4.1 MMOL/L — SIGNIFICANT CHANGE UP (ref 3.5–5)
POTASSIUM SERPL-SCNC: 4.1 MMOL/L — SIGNIFICANT CHANGE UP (ref 3.5–5)
PROT SERPL-MCNC: 7.4 G/DL — SIGNIFICANT CHANGE UP (ref 6–8)
PROTHROM AB SERPL-ACNC: 11.2 SEC — SIGNIFICANT CHANGE UP (ref 9.95–12.87)
RBC # BLD: 4.22 M/UL — SIGNIFICANT CHANGE UP (ref 4.2–5.4)
RBC # FLD: 12.5 % — SIGNIFICANT CHANGE UP (ref 11.5–14.5)
SODIUM SERPL-SCNC: 138 MMOL/L — SIGNIFICANT CHANGE UP (ref 135–146)
T3 SERPL-MCNC: 105 NG/DL — SIGNIFICANT CHANGE UP (ref 80–200)
T4 AB SER-ACNC: 7.7 UG/DL — SIGNIFICANT CHANGE UP (ref 4.6–12)
TSH SERPL-MCNC: 1.81 UIU/ML — SIGNIFICANT CHANGE UP (ref 0.27–4.2)
WBC # BLD: 8.76 K/UL — SIGNIFICANT CHANGE UP (ref 4.8–10.8)
WBC # FLD AUTO: 8.76 K/UL — SIGNIFICANT CHANGE UP (ref 4.8–10.8)

## 2022-06-21 PROCEDURE — 93010 ELECTROCARDIOGRAM REPORT: CPT

## 2022-06-21 PROCEDURE — 73562 X-RAY EXAM OF KNEE 3: CPT | Mod: 26,RT

## 2022-06-21 PROCEDURE — 71046 X-RAY EXAM CHEST 2 VIEWS: CPT | Mod: 26

## 2022-06-21 PROCEDURE — 72170 X-RAY EXAM OF PELVIS: CPT | Mod: 26

## 2022-06-21 NOTE — H&P PST ADULT - NSICDXPASTMEDICALHX_GEN_ALL_CORE_FT
PAST MEDICAL HISTORY:  Anxiety     Breast cancer     HTN (hypertension)     Hyperthyroidism NO MEDS- DX 2005    Left hip pain

## 2022-06-21 NOTE — H&P PST ADULT - REASON FOR ADMISSION
Proceduralist: Alan Geiger  Procedure: RIGHT TOTAL KNEE REPLACEMENT  Procedure: 120 Minutes  PT REPORTS--I HAVE OA IN MY RIGHT KNEE FOR SEVERAL YEARS.  RECENTLY THE PAIN HAS GOTTEN WORSE.  THE PAIN IS 6/10.  THE PAIN IS A SHARP, RADIATING  AND PRESSURE TYPE OF PAIN.   THE PAIN RADIATES UP AND DOWN MY RIGHT LEG.  PT POINTS TO HER RIGHT LEG SHOWING HOW THE PAIN RADIATES.  REST WILL RELIEVE THE PAIN SOMEWHAT.

## 2022-06-21 NOTE — H&P PST ADULT - HISTORY OF PRESENT ILLNESS
PT PRESENTS TO PAST WITH NO SOB, CP, PALPITATIONS, DYSURIA, UTI OR URI AT PRESENT.   PT ABLE TO WALK UP 2-3 FLIGHTS OF STEPS WITH NO SOB.  AS PER THE PT, THIS IS HIS/HER COMPLETE MEDICAL AND SURGICAL HX, INCLUDING MEDICATIONS PRESCRIBED AND OVER THE COUNTER  pt denies any covid s/s, or tested positive in the past--PT AWARE OF DATE AND TIME OF COVID TESTING PRIOR TO SURGERY  pt advised self quarantine till day of procedure  denies travel outside the [place] in the past 30 days  Anesthesia Alert  NO--Difficult Airway  NO--History of neck surgery or radiation  NO--Limited ROM of neck  NO--History of Malignant hyperthermia  NO--Personal or family history of Pseudocholinesterase deficiency  NO--Prior Anesthesia Complication  NO--Latex Allergy  NO--Loose teeth  NO--History of Rheumatoid Arthritis  NO--LUZ  NO BLEEDING RISK  NO--Other_____

## 2022-06-21 NOTE — H&P PST ADULT - NSICDXPASTSURGICALHX_GEN_ALL_CORE_FT
PAST SURGICAL HISTORY:  H/O colonoscopy 2016    S/P CYNDI-BSO     Status post left breast lumpectomy

## 2022-07-05 ENCOUNTER — LABORATORY RESULT (OUTPATIENT)
Age: 72
End: 2022-07-05

## 2022-07-07 ENCOUNTER — FORM ENCOUNTER (OUTPATIENT)
Age: 72
End: 2022-07-07

## 2022-07-08 ENCOUNTER — TRANSCRIPTION ENCOUNTER (OUTPATIENT)
Age: 72
End: 2022-07-08

## 2022-07-08 ENCOUNTER — RESULT REVIEW (OUTPATIENT)
Age: 72
End: 2022-07-08

## 2022-07-08 ENCOUNTER — APPOINTMENT (OUTPATIENT)
Dept: ORTHOPEDIC SURGERY | Facility: HOSPITAL | Age: 72
End: 2022-07-08

## 2022-07-08 ENCOUNTER — INPATIENT (INPATIENT)
Facility: HOSPITAL | Age: 72
LOS: 0 days | Discharge: HOME HEALTH | End: 2022-07-09
Attending: ORTHOPAEDIC SURGERY | Admitting: ORTHOPAEDIC SURGERY

## 2022-07-08 VITALS
DIASTOLIC BLOOD PRESSURE: 72 MMHG | HEIGHT: 62 IN | TEMPERATURE: 98 F | OXYGEN SATURATION: 100 % | HEART RATE: 58 BPM | RESPIRATION RATE: 17 BRPM | WEIGHT: 149.91 LBS | SYSTOLIC BLOOD PRESSURE: 155 MMHG

## 2022-07-08 DIAGNOSIS — Z96.642 PRESENCE OF LEFT ARTIFICIAL HIP JOINT: Chronic | ICD-10-CM

## 2022-07-08 DIAGNOSIS — Z98.890 OTHER SPECIFIED POSTPROCEDURAL STATES: Chronic | ICD-10-CM

## 2022-07-08 DIAGNOSIS — Z90.710 ACQUIRED ABSENCE OF BOTH CERVIX AND UTERUS: Chronic | ICD-10-CM

## 2022-07-08 LAB
GLUCOSE BLDC GLUCOMTR-MCNC: 116 MG/DL — HIGH (ref 70–99)
GLUCOSE BLDC GLUCOMTR-MCNC: 95 MG/DL — SIGNIFICANT CHANGE UP (ref 70–99)

## 2022-07-08 PROCEDURE — 27447 TOTAL KNEE ARTHROPLASTY: CPT | Mod: RT

## 2022-07-08 PROCEDURE — 88305 TISSUE EXAM BY PATHOLOGIST: CPT | Mod: 26

## 2022-07-08 PROCEDURE — 73560 X-RAY EXAM OF KNEE 1 OR 2: CPT | Mod: 26,RT

## 2022-07-08 PROCEDURE — 88311 DECALCIFY TISSUE: CPT | Mod: 26

## 2022-07-08 RX ORDER — PANTOPRAZOLE SODIUM 20 MG/1
40 TABLET, DELAYED RELEASE ORAL
Refills: 0 | Status: DISCONTINUED | OUTPATIENT
Start: 2022-07-08 | End: 2022-07-09

## 2022-07-08 RX ORDER — HYDROMORPHONE HYDROCHLORIDE 2 MG/ML
0.5 INJECTION INTRAMUSCULAR; INTRAVENOUS; SUBCUTANEOUS
Refills: 0 | Status: DISCONTINUED | OUTPATIENT
Start: 2022-07-08 | End: 2022-07-08

## 2022-07-08 RX ORDER — CEFAZOLIN SODIUM 1 G
2000 VIAL (EA) INJECTION EVERY 8 HOURS
Refills: 0 | Status: COMPLETED | OUTPATIENT
Start: 2022-07-08 | End: 2022-07-09

## 2022-07-08 RX ORDER — DEXAMETHASONE 0.5 MG/5ML
2 ELIXIR ORAL ONCE
Refills: 0 | Status: COMPLETED | OUTPATIENT
Start: 2022-07-09 | End: 2022-07-09

## 2022-07-08 RX ORDER — SENNA PLUS 8.6 MG/1
2 TABLET ORAL AT BEDTIME
Refills: 0 | Status: DISCONTINUED | OUTPATIENT
Start: 2022-07-08 | End: 2022-07-09

## 2022-07-08 RX ORDER — CELECOXIB 200 MG/1
200 CAPSULE ORAL ONCE
Refills: 0 | Status: COMPLETED | OUTPATIENT
Start: 2022-07-08 | End: 2022-07-08

## 2022-07-08 RX ORDER — SENNA PLUS 8.6 MG/1
2 TABLET ORAL
Qty: 10 | Refills: 0
Start: 2022-07-08 | End: 2022-07-12

## 2022-07-08 RX ORDER — MAGNESIUM HYDROXIDE 400 MG/1
30 TABLET, CHEWABLE ORAL DAILY
Refills: 0 | Status: DISCONTINUED | OUTPATIENT
Start: 2022-07-08 | End: 2022-07-09

## 2022-07-08 RX ORDER — CELECOXIB 200 MG/1
1 CAPSULE ORAL
Qty: 28 | Refills: 0
Start: 2022-07-08 | End: 2022-07-21

## 2022-07-08 RX ORDER — ALPRAZOLAM 0.25 MG
0.05 TABLET ORAL
Qty: 0 | Refills: 0 | DISCHARGE

## 2022-07-08 RX ORDER — ASPIRIN/CALCIUM CARB/MAGNESIUM 324 MG
1 TABLET ORAL
Qty: 60 | Refills: 0
Start: 2022-07-08 | End: 2022-08-06

## 2022-07-08 RX ORDER — ALPRAZOLAM 0.25 MG
0 TABLET ORAL
Qty: 0 | Refills: 0 | DISCHARGE

## 2022-07-08 RX ORDER — PANTOPRAZOLE SODIUM 20 MG/1
1 TABLET, DELAYED RELEASE ORAL
Qty: 30 | Refills: 0
Start: 2022-07-08 | End: 2022-08-06

## 2022-07-08 RX ORDER — CHLORHEXIDINE GLUCONATE 213 G/1000ML
1 SOLUTION TOPICAL DAILY
Refills: 0 | Status: DISCONTINUED | OUTPATIENT
Start: 2022-07-08 | End: 2022-07-09

## 2022-07-08 RX ORDER — HYDROMORPHONE HYDROCHLORIDE 2 MG/ML
1 INJECTION INTRAMUSCULAR; INTRAVENOUS; SUBCUTANEOUS
Refills: 0 | Status: DISCONTINUED | OUTPATIENT
Start: 2022-07-08 | End: 2022-07-08

## 2022-07-08 RX ORDER — OXYCODONE HYDROCHLORIDE 5 MG/1
5 TABLET ORAL EVERY 8 HOURS
Refills: 0 | Status: DISCONTINUED | OUTPATIENT
Start: 2022-07-08 | End: 2022-07-09

## 2022-07-08 RX ORDER — ALPRAZOLAM 0.25 MG
0.25 TABLET ORAL DAILY
Refills: 0 | Status: DISCONTINUED | OUTPATIENT
Start: 2022-07-08 | End: 2022-07-09

## 2022-07-08 RX ORDER — TRAMADOL HYDROCHLORIDE 50 MG/1
1 TABLET ORAL
Qty: 20 | Refills: 0
Start: 2022-07-08 | End: 2022-07-12

## 2022-07-08 RX ORDER — ASPIRIN/CALCIUM CARB/MAGNESIUM 324 MG
81 TABLET ORAL
Refills: 0 | Status: DISCONTINUED | OUTPATIENT
Start: 2022-07-08 | End: 2022-07-09

## 2022-07-08 RX ORDER — CELECOXIB 200 MG/1
200 CAPSULE ORAL EVERY 12 HOURS
Refills: 0 | Status: DISCONTINUED | OUTPATIENT
Start: 2022-07-09 | End: 2022-07-09

## 2022-07-08 RX ORDER — METOCLOPRAMIDE HCL 10 MG
10 TABLET ORAL ONCE
Refills: 0 | Status: DISCONTINUED | OUTPATIENT
Start: 2022-07-08 | End: 2022-07-08

## 2022-07-08 RX ORDER — ONDANSETRON 8 MG/1
4 TABLET, FILM COATED ORAL ONCE
Refills: 0 | Status: DISCONTINUED | OUTPATIENT
Start: 2022-07-08 | End: 2022-07-08

## 2022-07-08 RX ORDER — POLYETHYLENE GLYCOL 3350 17 G/17G
17 POWDER, FOR SOLUTION ORAL AT BEDTIME
Refills: 0 | Status: DISCONTINUED | OUTPATIENT
Start: 2022-07-08 | End: 2022-07-09

## 2022-07-08 RX ORDER — METOPROLOL TARTRATE 50 MG
25 TABLET ORAL DAILY
Refills: 0 | Status: DISCONTINUED | OUTPATIENT
Start: 2022-07-08 | End: 2022-07-09

## 2022-07-08 RX ORDER — TRAMADOL HYDROCHLORIDE 50 MG/1
50 TABLET ORAL EVERY 4 HOURS
Refills: 0 | Status: DISCONTINUED | OUTPATIENT
Start: 2022-07-08 | End: 2022-07-09

## 2022-07-08 RX ORDER — OXYCODONE HYDROCHLORIDE 5 MG/1
1 TABLET ORAL
Qty: 15 | Refills: 0
Start: 2022-07-08 | End: 2022-07-12

## 2022-07-08 RX ORDER — ACETAMINOPHEN 500 MG
1000 TABLET ORAL ONCE
Refills: 0 | Status: COMPLETED | OUTPATIENT
Start: 2022-07-08 | End: 2022-07-08

## 2022-07-08 RX ORDER — METOPROLOL TARTRATE 50 MG
1 TABLET ORAL
Qty: 0 | Refills: 0 | DISCHARGE

## 2022-07-08 RX ORDER — ACETAMINOPHEN 500 MG
650 TABLET ORAL EVERY 6 HOURS
Refills: 0 | Status: DISCONTINUED | OUTPATIENT
Start: 2022-07-08 | End: 2022-07-09

## 2022-07-08 RX ORDER — MEPERIDINE HYDROCHLORIDE 50 MG/ML
12.5 INJECTION INTRAMUSCULAR; INTRAVENOUS; SUBCUTANEOUS
Refills: 0 | Status: DISCONTINUED | OUTPATIENT
Start: 2022-07-08 | End: 2022-07-08

## 2022-07-08 RX ORDER — ONDANSETRON 8 MG/1
4 TABLET, FILM COATED ORAL EVERY 6 HOURS
Refills: 0 | Status: DISCONTINUED | OUTPATIENT
Start: 2022-07-08 | End: 2022-07-09

## 2022-07-08 RX ORDER — SODIUM CHLORIDE 9 MG/ML
1000 INJECTION, SOLUTION INTRAVENOUS
Refills: 0 | Status: DISCONTINUED | OUTPATIENT
Start: 2022-07-08 | End: 2022-07-08

## 2022-07-08 RX ORDER — ACETAMINOPHEN 500 MG
2 TABLET ORAL
Qty: 112 | Refills: 0
Start: 2022-07-08 | End: 2022-07-21

## 2022-07-08 RX ORDER — KETOROLAC TROMETHAMINE 30 MG/ML
15 SYRINGE (ML) INJECTION EVERY 6 HOURS
Refills: 0 | Status: DISCONTINUED | OUTPATIENT
Start: 2022-07-08 | End: 2022-07-09

## 2022-07-08 RX ORDER — SODIUM CHLORIDE 9 MG/ML
1000 INJECTION INTRAMUSCULAR; INTRAVENOUS; SUBCUTANEOUS
Refills: 0 | Status: DISCONTINUED | OUTPATIENT
Start: 2022-07-09 | End: 2022-07-09

## 2022-07-08 RX ADMIN — POLYETHYLENE GLYCOL 3350 17 GRAM(S): 17 POWDER, FOR SOLUTION ORAL at 22:13

## 2022-07-08 RX ADMIN — TRAMADOL HYDROCHLORIDE 50 MILLIGRAM(S): 50 TABLET ORAL at 23:30

## 2022-07-08 RX ADMIN — CELECOXIB 200 MILLIGRAM(S): 200 CAPSULE ORAL at 08:21

## 2022-07-08 RX ADMIN — Medication 1000 MILLIGRAM(S): at 13:55

## 2022-07-08 RX ADMIN — CELECOXIB 200 MILLIGRAM(S): 200 CAPSULE ORAL at 13:55

## 2022-07-08 RX ADMIN — SODIUM CHLORIDE 75 MILLILITER(S): 9 INJECTION, SOLUTION INTRAVENOUS at 14:05

## 2022-07-08 RX ADMIN — SENNA PLUS 2 TABLET(S): 8.6 TABLET ORAL at 22:14

## 2022-07-08 RX ADMIN — Medication 100 MILLIGRAM(S): at 22:12

## 2022-07-08 RX ADMIN — Medication 81 MILLIGRAM(S): at 18:16

## 2022-07-08 RX ADMIN — Medication 15 MILLIGRAM(S): at 18:17

## 2022-07-08 RX ADMIN — Medication 650 MILLIGRAM(S): at 18:16

## 2022-07-08 RX ADMIN — Medication 1000 MILLIGRAM(S): at 08:22

## 2022-07-08 RX ADMIN — TRAMADOL HYDROCHLORIDE 50 MILLIGRAM(S): 50 TABLET ORAL at 22:14

## 2022-07-08 NOTE — ASU PATIENT PROFILE, ADULT - FALL HARM RISK - HARM RISK INTERVENTIONS

## 2022-07-08 NOTE — PHYSICAL THERAPY INITIAL EVALUATION ADULT - GAIT DEVIATIONS NOTED, PT EVAL
stooped posture, dec heel strike/pushoff /stance on RLE, dec BEBETO/decreased maría/decreased step length/decreased weight-shifting ability

## 2022-07-08 NOTE — DISCHARGE NOTE PROVIDER - NSDCFUADDINST_GEN_ALL_CORE_FT
Please continue Physical therapy at home, weight bearing as tolerated.   Continue Aspirin 81mg twice a day x30 days to prevent blood clots.   Continue protonix 40mg once daily x30 days for GI prophylaxis.   Keep post-op dressing on for 1 week, showering with dressing on is allowed; dressing is water resistant. If dressing falls off before 7 days that is OK. Make sure to keep incison site dry after showering. Do not apply any creams or lotions to incision site.   If any purulent/excessive drainage please contact your surgeon.   Follow up with  in 3 weeks.

## 2022-07-08 NOTE — CHART NOTE - NSCHARTNOTEFT_GEN_A_CORE
PACU ANESTHESIA ADMISSION NOTE      Procedure:   Post op diagnosis:      ____  Intubated  TV:______       Rate: ______      FiO2: ______    _x___  Patent Airway    __x__  Full return of protective reflexes    ____  Full recovery from anesthesia / back to baseline     Vitals:   T: 98.2          R:  18                BP:   140/65               Sat:   97                P:  74      Mental Status:  __x__ Awake   __x___ Alert   _____ Drowsy   _____ Sedated    Nausea/Vomiting:  __x__ NO  ______Yes,   See Post - Op Orders          Pain Scale (0-10):  ___0__    Treatment: ____ None    ____ See Post - Op/PCA Orders    Post - Operative Fluids:   ____ Oral   __x__ See Post - Op Orders    Plan: Discharge:   ____Home       __x___Floor     _____Critical Care    _____  Other:_________________    Comments: d/c when meets criteria

## 2022-07-08 NOTE — PHYSICAL THERAPY INITIAL EVALUATION ADULT - GENERAL OBSERVATIONS, REHAB EVAL
15;45-16:15 Chart reviewed. Pt encountered sitting in chair, may be seen by Physical Therapist as confirmed with Nurse. Patient denied pain at rest and ready for therapy now; +Ace wrap RLE/compression socks/heplock

## 2022-07-08 NOTE — DISCHARGE NOTE PROVIDER - NSDCFUSCHEDAPPT_GEN_ALL_CORE_FT
Alan Orta  [facility]  [facility] [address]  Scheduled Appointment: 07/28/2022    Alna Orta  [facility]  [facility] [address]  Scheduled Appointment: 08/18/2022

## 2022-07-08 NOTE — ASU PATIENT PROFILE, ADULT - NSICDXPASTSURGICALHX_GEN_ALL_CORE_FT
PAST SURGICAL HISTORY:  H/O colonoscopy 2016    History of total hip replacement, left     S/P CYNDI-BSO     Status post left breast lumpectomy

## 2022-07-08 NOTE — DISCHARGE NOTE PROVIDER - HOSPITAL COURSE
71y Femaleunderwent a right tka on 7/8/2022. Patient tolerated surgery well with no intra/post operative complications. Patient was given intra/post operative antibiotics for infection prophylaxis. Patient will be discharged on aspirin 81mg BID x30 days  to prevent blood clots. Patient worked with Physical Therapy while admitted to the hospital. Patient is stable for discharge.

## 2022-07-08 NOTE — PHYSICAL THERAPY INITIAL EVALUATION ADULT - MANUAL MUSCLE TESTING RESULTS, REHAB EVAL
LLE ms strength grossly  graded 3+ to 4-/5; (R) hip/ankle 3 to 3+/5; (R) knee 3-/5; Please refer to OT vivian for BUE

## 2022-07-08 NOTE — PHYSICAL THERAPY INITIAL EVALUATION ADULT - PERTINENT HX OF CURRENT PROBLEM, REHAB EVAL
70 y/o female admitted for elective (R) TKA for primary OA (R) knee under regional anesthesia, pod#0

## 2022-07-08 NOTE — PATIENT PROFILE ADULT - FUNCTIONAL ASSESSMENT - DAILY ACTIVITY ASSESSMENT TYPE
10/07/19 CT PELVIS: Nonspecific widening of the pubic symphysis with associated moderate-sized joint effusion. No acute fracture. Mild bilateral hip arthrosis with suspected small to moderate-sized right hip joint effusion.  L SHOULDER: no fx or dislocation. Mild acromioclavicular joint arthrosis. Preserved glenohumeral joint space.   XR CHEST: The heart is enlarged. Status post transaortic valve replacement. Changes of the lungs compatible with pulmonary edema. A pacer is in good position. Small right pleural effusion is evident. Calcified mitral valve annulus.  HEAD CT:  Unchanged volume loss and microvascular disease without  hemorrhage or midline shift. If symptoms persist consider follow-up head CT or MR if no contraindications.  CERVICAL SPINE CT: No new obvious acute fracture pr  dislocation, no prevertebral soft tissue swelling, re-demonstration of multilevel degenerative change, Right vocal cord palsy, bilateral right larger left pleural effusion
Admission

## 2022-07-08 NOTE — ASU PATIENT PROFILE, ADULT - NSICDXPASTMEDICALHX_GEN_ALL_CORE_FT
PAST MEDICAL HISTORY:  Anxiety     Breast cancer     Former cigarette smoker     HTN (hypertension)     Hyperthyroidism NO MEDS- DX 2005    Left hip pain

## 2022-07-08 NOTE — PROGRESS NOTE ADULT - SUBJECTIVE AND OBJECTIVE BOX
RIGHT  TKA ORTHOPEDIC POST OP CHECK    T(C): 36.4 (07-08-22 @ 14:03), Max: 36.8 (07-08-22 @ 13:18)  HR: 73 (07-08-22 @ 14:18) (58 - 75)  BP: 130/59 (07-08-22 @ 14:18) (120/57 - 155/72)  RR: 16 (07-08-22 @ 14:18) (15 - 18)  SpO2: 97% (07-08-22 @ 14:18) (97% - 100%)      S/P RIGHT TKA ARTHROPLASTY  PAIN CONTROLLED  VSS   A&O X3  DRESSING C/D/I  NVI  ANTIBIOTICS INFUSED  DVT PROPHYLAXIS ORDERED  ENCOURAGE INCENTIVE SPIROMETRY  AM LABS  REHAB TO BEGIN ON THE FLOORS   D/C PLANNING

## 2022-07-08 NOTE — DISCHARGE NOTE PROVIDER - NSDCMRMEDTOKEN_GEN_ALL_CORE_FT
acetaminophen 325 mg oral tablet: 2 tab(s) orally every 6 hours x 14 days  metoprolol succinate 25 mg oral capsule, extended release: 1 cap(s) orally once a day  senna oral tablet: 2 tab(s) orally once a day (at bedtime)  Xanax:    acetaminophen 650 mg oral tablet, extended release: 2 tab(s) orally every 6 hours MDD:8  aspirin 81 mg oral delayed release tablet: 1 tab(s) orally 2 times a day MDD:2  celecoxib 200 mg oral capsule: 1 cap(s) orally every 12 hours MDD:2  metoprolol succinate 25 mg oral capsule, extended release: 1 cap(s) orally once a day  oxyCODONE 5 mg oral tablet: 1 tab(s) orally every 8 hours, As needed, Moderate Pain (4 - 6) MDD:3  pantoprazole 40 mg oral delayed release tablet: 1 tab(s) orally once a day (before a meal) MDD:1  senna oral tablet: 2 tab(s) orally once a day (at bedtime) MDD:2  traMADol 50 mg oral tablet: 1 tab(s) orally every 6 hours, As Needed -Mild Pain (1 - 3) MDD:4  Xanax: 0.05 milligram(s) orally once (at bedtime), As Needed

## 2022-07-08 NOTE — PATIENT PROFILE ADULT - FALL HARM RISK - HARM RISK INTERVENTIONS
Assistance with ambulation/Assistance OOB with selected safe patient handling equipment/Communicate Risk of Fall with Harm to all staff/Discuss with provider need for PT consult/Monitor gait and stability/Provide patient with walking aids - walker, cane, crutches/Reinforce activity limits and safety measures with patient and family/Sit up slowly, dangle for a short time, stand at bedside before walking/Tailored Fall Risk Interventions/Use of alarms - bed, chair and/or voice tab/Visual Cue: Yellow wristband and red socks/Bed in lowest position, wheels locked, appropriate side rails in place/Call bell, personal items and telephone in reach/Instruct patient to call for assistance before getting out of bed or chair/Non-slip footwear when patient is out of bed/[place] to call system/Physically safe environment - no spills, clutter or unnecessary equipment/Purposeful Proactive Rounding/Room/bathroom lighting operational, light cord in reach

## 2022-07-08 NOTE — DISCHARGE NOTE PROVIDER - NSDCCPCAREPLAN_GEN_ALL_CORE_FT
PRINCIPAL DISCHARGE DIAGNOSIS  Diagnosis: S/P total knee replacement, right  Assessment and Plan of Treatment:

## 2022-07-08 NOTE — PHYSICAL THERAPY INITIAL EVALUATION ADULT - ACTIVE RANGE OF MOTION EXAMINATION, REHAB EVAL
RLE required AAROM/AROM with (R) knee flexion 0-90 degrees in supine; Please refer to OT eval for BUE/Left LE Active ROM was WFL (within functional limits)

## 2022-07-08 NOTE — DISCHARGE NOTE PROVIDER - CARE PROVIDER_API CALL
Alan Quiñonez)  Orthopaedic Surgery  [address] [place] [address]  Phone: [phone]  Fax: [phone]  Follow Up Time: 2 weeks

## 2022-07-08 NOTE — PHYSICAL THERAPY INITIAL EVALUATION ADULT - ADDITIONAL COMMENTS
Per patient, she lives alone in an apartment in a private home with about 7 steps with (L) rail going down to enter apartment; no further steps inside home; was not using any assistive device

## 2022-07-09 ENCOUNTER — FORM ENCOUNTER (OUTPATIENT)
Age: 72
End: 2022-07-09

## 2022-07-09 ENCOUNTER — TRANSCRIPTION ENCOUNTER (OUTPATIENT)
Age: 72
End: 2022-07-09

## 2022-07-09 VITALS
DIASTOLIC BLOOD PRESSURE: 62 MMHG | RESPIRATION RATE: 16 BRPM | SYSTOLIC BLOOD PRESSURE: 119 MMHG | TEMPERATURE: 96 F | HEART RATE: 65 BPM

## 2022-07-09 LAB
ANION GAP SERPL CALC-SCNC: 8 MMOL/L — SIGNIFICANT CHANGE UP (ref 7–14)
BUN SERPL-MCNC: 13 MG/DL — SIGNIFICANT CHANGE UP (ref 10–20)
CALCIUM SERPL-MCNC: 9.3 MG/DL — SIGNIFICANT CHANGE UP (ref 8.5–10.1)
CHLORIDE SERPL-SCNC: 103 MMOL/L — SIGNIFICANT CHANGE UP (ref 98–110)
CO2 SERPL-SCNC: 27 MMOL/L — SIGNIFICANT CHANGE UP (ref 17–32)
CREAT SERPL-MCNC: 0.7 MG/DL — SIGNIFICANT CHANGE UP (ref 0.7–1.5)
EGFR: 93 ML/MIN/1.73M2 — SIGNIFICANT CHANGE UP
GLUCOSE SERPL-MCNC: 106 MG/DL — HIGH (ref 70–99)
HCT VFR BLD CALC: 35.5 % — LOW (ref 37–47)
HGB BLD-MCNC: 11.6 G/DL — LOW (ref 12–16)
MCHC RBC-ENTMCNC: 30.4 PG — SIGNIFICANT CHANGE UP (ref 27–31)
MCHC RBC-ENTMCNC: 32.7 G/DL — SIGNIFICANT CHANGE UP (ref 32–37)
MCV RBC AUTO: 93.2 FL — SIGNIFICANT CHANGE UP (ref 81–99)
NRBC # BLD: 0 /100 WBCS — SIGNIFICANT CHANGE UP (ref 0–0)
PLATELET # BLD AUTO: 287 K/UL — SIGNIFICANT CHANGE UP (ref 130–400)
POTASSIUM SERPL-MCNC: 4.6 MMOL/L — SIGNIFICANT CHANGE UP (ref 3.5–5)
POTASSIUM SERPL-SCNC: 4.6 MMOL/L — SIGNIFICANT CHANGE UP (ref 3.5–5)
RBC # BLD: 3.81 M/UL — LOW (ref 4.2–5.4)
RBC # FLD: 11.9 % — SIGNIFICANT CHANGE UP (ref 11.5–14.5)
SODIUM SERPL-SCNC: 138 MMOL/L — SIGNIFICANT CHANGE UP (ref 135–146)
WBC # BLD: 18.36 K/UL — HIGH (ref 4.8–10.8)
WBC # FLD AUTO: 18.36 K/UL — HIGH (ref 4.8–10.8)

## 2022-07-09 PROCEDURE — 99221 1ST HOSP IP/OBS SF/LOW 40: CPT

## 2022-07-09 RX ADMIN — Medication 100 MILLIGRAM(S): at 03:56

## 2022-07-09 RX ADMIN — Medication 650 MILLIGRAM(S): at 01:30

## 2022-07-09 RX ADMIN — Medication 650 MILLIGRAM(S): at 00:49

## 2022-07-09 RX ADMIN — Medication 650 MILLIGRAM(S): at 05:37

## 2022-07-09 RX ADMIN — CELECOXIB 200 MILLIGRAM(S): 200 CAPSULE ORAL at 12:44

## 2022-07-09 RX ADMIN — Medication 650 MILLIGRAM(S): at 11:22

## 2022-07-09 RX ADMIN — SODIUM CHLORIDE 75 MILLILITER(S): 9 INJECTION INTRAMUSCULAR; INTRAVENOUS; SUBCUTANEOUS at 00:48

## 2022-07-09 RX ADMIN — Medication 15 MILLIGRAM(S): at 02:00

## 2022-07-09 RX ADMIN — Medication 1 TABLET(S): at 11:23

## 2022-07-09 RX ADMIN — Medication 15 MILLIGRAM(S): at 00:48

## 2022-07-09 RX ADMIN — Medication 650 MILLIGRAM(S): at 05:54

## 2022-07-09 RX ADMIN — Medication 650 MILLIGRAM(S): at 11:52

## 2022-07-09 RX ADMIN — Medication 81 MILLIGRAM(S): at 05:37

## 2022-07-09 RX ADMIN — PANTOPRAZOLE SODIUM 40 MILLIGRAM(S): 20 TABLET, DELAYED RELEASE ORAL at 05:36

## 2022-07-09 RX ADMIN — CELECOXIB 200 MILLIGRAM(S): 200 CAPSULE ORAL at 13:14

## 2022-07-09 RX ADMIN — Medication 2 MILLIGRAM(S): at 05:35

## 2022-07-09 RX ADMIN — Medication 25 MILLIGRAM(S): at 05:37

## 2022-07-09 NOTE — DISCHARGE NOTE NURSING/CASE MANAGEMENT/SOCIAL WORK - PATIENT PORTAL LINK FT
You can access the FollowMyHealth Patient Portal offered by [facility] by registering at the following website: [email or web address]. By joining [employer]’s FollowMyHealth portal, you will also be able to view your health information using other applications (apps) compatible with our system.

## 2022-07-09 NOTE — OCCUPATIONAL THERAPY INITIAL EVALUATION ADULT - LIVES WITH, PROFILE
alone, but children coming to stay with for a couple of days, in an apartment +7 steps to enter going down no steps inside, +tub +commode/alone

## 2022-07-09 NOTE — OCCUPATIONAL THERAPY INITIAL EVALUATION ADULT - GENERAL OBSERVATIONS, REHAB EVAL
9:14-9:30; Chart reviewed, ok to treat by Occupational Therapist as confirmed by RN Nayeli, Pt received in bedside chair +ace wrap right knee +left arm precaution due to lumpectomy and DCI, in NAD.  Pt in agreement with OT IE. 9:14-9:30; Chart reviewed, ok to treat by Occupational Therapist as confirmed by RN Nayeli, Pt received in bedside chair +ace wrap right knee, in NAD.  Pt in agreement with OT IE.

## 2022-07-09 NOTE — PROGRESS NOTE ADULT - SUBJECTIVE AND OBJECTIVE BOX
POD #1  Right total knee arthroplasty   · Operative Findings	Tricompartmental degenerative changes of the right knee    pt seen , doing well   no complaints   walked with pt   denies cp/ sob / fever/ chills    Vital Signs Last 24 Hrs  T(C): 36.7 (09 Jul 2022 07:06), Max: 36.8 (08 Jul 2022 13:18)  T(F): 98 (09 Jul 2022 07:06), Max: 98.2 (08 Jul 2022 13:18)  HR: 56 (09 Jul 2022 07:06) (56 - 75)  BP: 128/60 (09 Jul 2022 07:06) (112/64 - 140/65)  RR: 16 (09 Jul 2022 07:06) (15 - 18)  SpO2: 97% (08 Jul 2022 14:18) (97% - 99%)    Parameters below as of 08 Jul 2022 13:18  Patient On (Oxygen Delivery Method): room air    PE:  chest: cta b/l  cardio: s1s2 heard  abd: bs+ ntnd   rt knee: dressing intact , no ecchymosis on skin     POD #1  Right total knee arthroplasty     - removed dressing , left open to air, aquacell dressing intact   d/w ortho attending   may d/c

## 2022-07-09 NOTE — CONSULT NOTE ADULT - SUBJECTIVE AND OBJECTIVE BOX
71y Female no PMHx underwent a right knee replacement on 7/8/2022.     Patient is a 71y old  Female who presents with a chief complaint of right tka  (08 Jul 2022 12:44)      OVERNIGHT EVENTS: 71y Female no PMHx underwent a right knee replacement on 7/8/2022. pt pod 2 doing well, no SOB, fever or cough,     SUBJECTIVE / INTERVAL HPI: Patient seen and examined at bedside.     VITAL SIGNS:  Vital Signs Last 24 Hrs  T(C): 35.7 (09 Jul 2022 11:21), Max: 36.7 (09 Jul 2022 07:06)  T(F): 96.3 (09 Jul 2022 11:21), Max: 98 (09 Jul 2022 07:06)  HR: 65 (09 Jul 2022 11:21) (56 - 75)  BP: 119/62 (09 Jul 2022 11:21) (112/64 - 137/67)  BP(mean): --  RR: 16 (09 Jul 2022 11:21) (16 - 18)  SpO2: 97% (08 Jul 2022 14:18) (97% - 97%)        PHYSICAL EXAM:    General: WDWN  HEENT: NC/AT; PERRL, clear conjunctiva  Neck: supple  Cardiovascular: +S1/S2; RRR  Respiratory: CTA b/l; no W/R/R  Gastrointestinal: soft, NT/ND; +BSx4  Extremities: WWP; 2+ peripheral pulses; no edema   Neurological: AAOx3; no focal deficits    MEDICATIONS:  MEDICATIONS  (STANDING):  acetaminophen     Tablet .. 650 milliGRAM(s) Oral every 6 hours  aspirin enteric coated 81 milliGRAM(s) Oral two times a day  celecoxib 200 milliGRAM(s) Oral every 12 hours  chlorhexidine 2% Cloths 1 Application(s) Topical daily  metoprolol succinate ER 25 milliGRAM(s) Oral daily  multivitamin 1 Tablet(s) Oral daily  pantoprazole    Tablet 40 milliGRAM(s) Oral before breakfast  polyethylene glycol 3350 17 Gram(s) Oral at bedtime  senna 2 Tablet(s) Oral at bedtime  senna 2 Tablet(s) Oral at bedtime  sodium chloride 0.9%. 1000 milliLiter(s) (75 mL/Hr) IV Continuous <Continuous>    MEDICATIONS  (PRN):  ALPRAZolam 0.25 milliGRAM(s) Oral daily PRN anxiety  magnesium hydroxide Suspension 30 milliLiter(s) Oral daily PRN Constipation  ondansetron Injectable 4 milliGRAM(s) IV Push every 6 hours PRN Nausea and/or Vomiting  oxyCODONE    IR 5 milliGRAM(s) Oral every 8 hours PRN Moderate Pain (4 - 6)  traMADol 50 milliGRAM(s) Oral every 4 hours PRN Mild Pain (1 - 3)      ALLERGIES:  Allergies    No Known Allergies    Intolerances        LABS:                        11.6   18.36 )-----------( 287      ( 09 Jul 2022 08:01 )             35.5     07-09    138  |  103  |  13  ----------------------------<  106<H>  4.6   |  27  |  0.7    Ca    9.3      09 Jul 2022 08:01          CAPILLARY BLOOD GLUCOSE      POCT Blood Glucose.: 116 mg/dL (08 Jul 2022 13:26)   71y Female with HTN underwent a right knee replacement on 7/8/2022.     Patient is a 71y old  Female who presents with a chief complaint of right tka  (08 Jul 2022 12:44)      OVERNIGHT EVENTS: 71y Female no PMHx underwent a right knee replacement on 7/8/2022. pt pod 2 doing well, no SOB, fever or cough,     SUBJECTIVE / INTERVAL HPI: Patient seen and examined at bedside.     VITAL SIGNS:  Vital Signs Last 24 Hrs  T(C): 35.7 (09 Jul 2022 11:21), Max: 36.7 (09 Jul 2022 07:06)  T(F): 96.3 (09 Jul 2022 11:21), Max: 98 (09 Jul 2022 07:06)  HR: 65 (09 Jul 2022 11:21) (56 - 75)  BP: 119/62 (09 Jul 2022 11:21) (112/64 - 137/67)  BP(mean): --  RR: 16 (09 Jul 2022 11:21) (16 - 18)  SpO2: 97% (08 Jul 2022 14:18) (97% - 97%)        PHYSICAL EXAM:    General: WDWN  HEENT: NC/AT; PERRL, clear conjunctiva  Neck: supple  Cardiovascular: +S1/S2; RRR  Respiratory: CTA b/l; no W/R/R  Gastrointestinal: soft, NT/ND; +BSx4  Extremities: WWP; 2+ peripheral pulses; no edema   Neurological: AAOx3; no focal deficits    MEDICATIONS:  MEDICATIONS  (STANDING):  acetaminophen     Tablet .. 650 milliGRAM(s) Oral every 6 hours  aspirin enteric coated 81 milliGRAM(s) Oral two times a day  celecoxib 200 milliGRAM(s) Oral every 12 hours  chlorhexidine 2% Cloths 1 Application(s) Topical daily  metoprolol succinate ER 25 milliGRAM(s) Oral daily  multivitamin 1 Tablet(s) Oral daily  pantoprazole    Tablet 40 milliGRAM(s) Oral before breakfast  polyethylene glycol 3350 17 Gram(s) Oral at bedtime  senna 2 Tablet(s) Oral at bedtime  senna 2 Tablet(s) Oral at bedtime  sodium chloride 0.9%. 1000 milliLiter(s) (75 mL/Hr) IV Continuous <Continuous>    MEDICATIONS  (PRN):  ALPRAZolam 0.25 milliGRAM(s) Oral daily PRN anxiety  magnesium hydroxide Suspension 30 milliLiter(s) Oral daily PRN Constipation  ondansetron Injectable 4 milliGRAM(s) IV Push every 6 hours PRN Nausea and/or Vomiting  oxyCODONE    IR 5 milliGRAM(s) Oral every 8 hours PRN Moderate Pain (4 - 6)  traMADol 50 milliGRAM(s) Oral every 4 hours PRN Mild Pain (1 - 3)      ALLERGIES:  Allergies    No Known Allergies    Intolerances        LABS:                        11.6   18.36 )-----------( 287      ( 09 Jul 2022 08:01 )             35.5     07-09    138  |  103  |  13  ----------------------------<  106<H>  4.6   |  27  |  0.7    Ca    9.3      09 Jul 2022 08:01          CAPILLARY BLOOD GLUCOSE      POCT Blood Glucose.: 116 mg/dL (08 Jul 2022 13:26)

## 2022-07-09 NOTE — CONSULT NOTE ADULT - ASSESSMENT
71y Female no PMHx underwent a right knee replacement on 7/8/2022.     - pt seen and examined, labs reviewed, pt has no complaints,   - noted leukocytosis, pt was given steroid, exam not showing any signs of infections   - stable from medical stand point  - pain control  - DVT ppx as per ortho  - f/u with her PCP as outpt 71y Female with HTN underwent a right knee replacement on 7/8/2022.     - pt seen and examined, labs reviewed, pt has no complaints,   - noted leukocytosis, pt was given steroid, exam not showing any signs of infections   - c/w home meds, stable from medical stand point  - pain control  - DVT ppx as per ortho  - f/u with her PCP as outpt

## 2022-07-09 NOTE — DISCHARGE NOTE NURSING/CASE MANAGEMENT/SOCIAL WORK - NSDCPEFALRISK_GEN_ALL_CORE
For information on Fall & Injury Prevention, visit: [email or web address] OR  [email or web address] OR  https://www.cdc.gov/steadi/patient.html

## 2022-07-12 LAB — SURGICAL PATHOLOGY STUDY: SIGNIFICANT CHANGE UP

## 2022-07-13 DIAGNOSIS — M17.11 UNILATERAL PRIMARY OSTEOARTHRITIS, RIGHT KNEE: ICD-10-CM

## 2022-07-13 DIAGNOSIS — Z85.3 PERSONAL HISTORY OF MALIGNANT NEOPLASM OF BREAST: ICD-10-CM

## 2022-07-13 DIAGNOSIS — Z90.710 ACQUIRED ABSENCE OF BOTH CERVIX AND UTERUS: ICD-10-CM

## 2022-07-13 DIAGNOSIS — D72.829 ELEVATED WHITE BLOOD CELL COUNT, UNSPECIFIED: ICD-10-CM

## 2022-07-13 DIAGNOSIS — Z87.891 PERSONAL HISTORY OF NICOTINE DEPENDENCE: ICD-10-CM

## 2022-07-13 DIAGNOSIS — Z96.642 PRESENCE OF LEFT ARTIFICIAL HIP JOINT: ICD-10-CM

## 2022-07-13 DIAGNOSIS — F41.9 ANXIETY DISORDER, UNSPECIFIED: ICD-10-CM

## 2022-07-13 DIAGNOSIS — I10 ESSENTIAL (PRIMARY) HYPERTENSION: ICD-10-CM

## 2022-07-28 ENCOUNTER — APPOINTMENT (OUTPATIENT)
Dept: ORTHOPEDIC SURGERY | Facility: CLINIC | Age: 72
End: 2022-07-28

## 2022-07-28 ENCOUNTER — RESULT CHARGE (OUTPATIENT)
Age: 72
End: 2022-07-28

## 2022-07-28 PROBLEM — E05.90 THYROTOXICOSIS, UNSPECIFIED WITHOUT THYROTOXIC CRISIS OR STORM: Chronic | Status: ACTIVE | Noted: 2022-06-21

## 2022-07-28 PROBLEM — Z87.891 PERSONAL HISTORY OF NICOTINE DEPENDENCE: Chronic | Status: ACTIVE | Noted: 2022-06-29

## 2022-07-28 PROBLEM — I10 ESSENTIAL (PRIMARY) HYPERTENSION: Chronic | Status: ACTIVE | Noted: 2022-06-21

## 2022-07-28 PROCEDURE — 99024 POSTOP FOLLOW-UP VISIT: CPT

## 2022-07-28 RX ORDER — PANTOPRAZOLE 40 MG/1
40 TABLET, DELAYED RELEASE ORAL
Qty: 30 | Refills: 0 | Status: ACTIVE | COMMUNITY
Start: 2022-07-08

## 2022-07-28 RX ORDER — ASPIRIN 81 MG/1
81 TABLET, COATED ORAL
Qty: 60 | Refills: 0 | Status: ACTIVE | COMMUNITY
Start: 2022-07-08

## 2022-07-28 RX ORDER — OXYCODONE 5 MG/1
5 TABLET ORAL
Qty: 15 | Refills: 0 | Status: ACTIVE | COMMUNITY
Start: 2022-07-08

## 2022-07-28 RX ORDER — METOPROLOL TARTRATE 25 MG/1
25 TABLET, FILM COATED ORAL
Qty: 90 | Refills: 0 | Status: ACTIVE | COMMUNITY
Start: 2022-06-20

## 2022-07-28 RX ORDER — CELECOXIB 200 MG/1
200 CAPSULE ORAL
Qty: 28 | Refills: 0 | Status: ACTIVE | COMMUNITY
Start: 2022-07-08

## 2022-07-28 RX ORDER — ALPRAZOLAM 0.25 MG/1
0.25 TABLET ORAL
Qty: 30 | Refills: 0 | Status: ACTIVE | COMMUNITY
Start: 2022-03-13

## 2022-07-28 RX ORDER — TRAMADOL HYDROCHLORIDE 50 MG/1
50 TABLET, COATED ORAL
Qty: 20 | Refills: 0 | Status: ACTIVE | COMMUNITY
Start: 2022-07-08

## 2022-07-28 NOTE — HISTORY OF PRESENT ILLNESS
[de-identified] : s/p R tka\par doing well\par staples out\par ROM 0-120\par knee stable\par negative kurt\par \par Imaging:\par X-rays were taken in the office today.  \par AP and Lateral views were obtained of the knees.\par X-rays are negative for acute bone or soft tissue trauma.\par R tka in good position\par \par start OP PT\par f/u in 6 weeks
no

## 2022-08-18 ENCOUNTER — APPOINTMENT (OUTPATIENT)
Dept: ORTHOPEDIC SURGERY | Facility: CLINIC | Age: 72
End: 2022-08-18

## 2022-09-06 ENCOUNTER — APPOINTMENT (OUTPATIENT)
Dept: ORTHOPEDIC SURGERY | Facility: CLINIC | Age: 72
End: 2022-09-06

## 2022-09-06 PROCEDURE — 99024 POSTOP FOLLOW-UP VISIT: CPT

## 2022-09-06 NOTE — HISTORY OF PRESENT ILLNESS
[de-identified] : s/p right total knee\par doing well\par incision well healed\par finished pt\par plan for home exercises\par f/u as needed

## 2022-11-23 ENCOUNTER — EMERGENCY (EMERGENCY)
Facility: HOSPITAL | Age: 72
LOS: 0 days | Discharge: HOME | End: 2022-11-23
Attending: EMERGENCY MEDICINE | Admitting: EMERGENCY MEDICINE

## 2022-11-23 VITALS
RESPIRATION RATE: 20 BRPM | DIASTOLIC BLOOD PRESSURE: 77 MMHG | HEART RATE: 79 BPM | TEMPERATURE: 98 F | SYSTOLIC BLOOD PRESSURE: 170 MMHG | HEIGHT: 62 IN | WEIGHT: 147.05 LBS | OXYGEN SATURATION: 99 %

## 2022-11-23 VITALS
SYSTOLIC BLOOD PRESSURE: 168 MMHG | DIASTOLIC BLOOD PRESSURE: 78 MMHG | OXYGEN SATURATION: 99 % | RESPIRATION RATE: 18 BRPM | HEART RATE: 76 BPM

## 2022-11-23 DIAGNOSIS — N39.0 URINARY TRACT INFECTION, SITE NOT SPECIFIED: ICD-10-CM

## 2022-11-23 DIAGNOSIS — Z90.12 ACQUIRED ABSENCE OF LEFT BREAST AND NIPPLE: ICD-10-CM

## 2022-11-23 DIAGNOSIS — Z90.710 ACQUIRED ABSENCE OF BOTH CERVIX AND UTERUS: Chronic | ICD-10-CM

## 2022-11-23 DIAGNOSIS — E05.90 THYROTOXICOSIS, UNSPECIFIED WITHOUT THYROTOXIC CRISIS OR STORM: ICD-10-CM

## 2022-11-23 DIAGNOSIS — Z90.710 ACQUIRED ABSENCE OF BOTH CERVIX AND UTERUS: ICD-10-CM

## 2022-11-23 DIAGNOSIS — Z96.642 PRESENCE OF LEFT ARTIFICIAL HIP JOINT: ICD-10-CM

## 2022-11-23 DIAGNOSIS — Z98.890 OTHER SPECIFIED POSTPROCEDURAL STATES: Chronic | ICD-10-CM

## 2022-11-23 DIAGNOSIS — Z85.3 PERSONAL HISTORY OF MALIGNANT NEOPLASM OF BREAST: ICD-10-CM

## 2022-11-23 DIAGNOSIS — R11.0 NAUSEA: ICD-10-CM

## 2022-11-23 DIAGNOSIS — Z96.642 PRESENCE OF LEFT ARTIFICIAL HIP JOINT: Chronic | ICD-10-CM

## 2022-11-23 DIAGNOSIS — Z79.82 LONG TERM (CURRENT) USE OF ASPIRIN: ICD-10-CM

## 2022-11-23 DIAGNOSIS — Z87.891 PERSONAL HISTORY OF NICOTINE DEPENDENCE: ICD-10-CM

## 2022-11-23 DIAGNOSIS — R19.7 DIARRHEA, UNSPECIFIED: ICD-10-CM

## 2022-11-23 DIAGNOSIS — R10.9 UNSPECIFIED ABDOMINAL PAIN: ICD-10-CM

## 2022-11-23 DIAGNOSIS — F41.9 ANXIETY DISORDER, UNSPECIFIED: ICD-10-CM

## 2022-11-23 LAB
ALBUMIN SERPL ELPH-MCNC: 4.5 G/DL — SIGNIFICANT CHANGE UP (ref 3.5–5.2)
ALP SERPL-CCNC: 100 U/L — SIGNIFICANT CHANGE UP (ref 30–115)
ALT FLD-CCNC: 13 U/L — SIGNIFICANT CHANGE UP (ref 0–41)
ANION GAP SERPL CALC-SCNC: 11 MMOL/L — SIGNIFICANT CHANGE UP (ref 7–14)
APPEARANCE UR: CLEAR — SIGNIFICANT CHANGE UP
AST SERPL-CCNC: 19 U/L — SIGNIFICANT CHANGE UP (ref 0–41)
BACTERIA # UR AUTO: ABNORMAL
BASOPHILS # BLD AUTO: 0.07 K/UL — SIGNIFICANT CHANGE UP (ref 0–0.2)
BASOPHILS NFR BLD AUTO: 0.8 % — SIGNIFICANT CHANGE UP (ref 0–1)
BILIRUB SERPL-MCNC: 0.3 MG/DL — SIGNIFICANT CHANGE UP (ref 0.2–1.2)
BILIRUB UR-MCNC: ABNORMAL
BUN SERPL-MCNC: 12 MG/DL — SIGNIFICANT CHANGE UP (ref 10–20)
CALCIUM SERPL-MCNC: 10 MG/DL — SIGNIFICANT CHANGE UP (ref 8.4–10.5)
CHLORIDE SERPL-SCNC: 101 MMOL/L — SIGNIFICANT CHANGE UP (ref 98–110)
CO2 SERPL-SCNC: 26 MMOL/L — SIGNIFICANT CHANGE UP (ref 17–32)
COD CRY URNS QL: ABNORMAL
COLOR SPEC: YELLOW — SIGNIFICANT CHANGE UP
COMMENT - URINE: SIGNIFICANT CHANGE UP
CREAT SERPL-MCNC: 0.8 MG/DL — SIGNIFICANT CHANGE UP (ref 0.7–1.5)
DIFF PNL FLD: NEGATIVE — SIGNIFICANT CHANGE UP
EGFR: 79 ML/MIN/1.73M2 — SIGNIFICANT CHANGE UP
EOSINOPHIL # BLD AUTO: 0.04 K/UL — SIGNIFICANT CHANGE UP (ref 0–0.7)
EOSINOPHIL NFR BLD AUTO: 0.5 % — SIGNIFICANT CHANGE UP (ref 0–8)
EPI CELLS # UR: ABNORMAL /HPF
GLUCOSE SERPL-MCNC: 96 MG/DL — SIGNIFICANT CHANGE UP (ref 70–99)
GLUCOSE UR QL: NEGATIVE MG/DL — SIGNIFICANT CHANGE UP
HCT VFR BLD CALC: 40.3 % — SIGNIFICANT CHANGE UP (ref 37–47)
HGB BLD-MCNC: 13 G/DL — SIGNIFICANT CHANGE UP (ref 12–16)
IMM GRANULOCYTES NFR BLD AUTO: 0.2 % — SIGNIFICANT CHANGE UP (ref 0.1–0.3)
KETONES UR-MCNC: ABNORMAL
LACTATE SERPL-SCNC: 1.3 MMOL/L — SIGNIFICANT CHANGE UP (ref 0.7–2)
LEUKOCYTE ESTERASE UR-ACNC: ABNORMAL
LIDOCAIN IGE QN: 25 U/L — SIGNIFICANT CHANGE UP (ref 7–60)
LYMPHOCYTES # BLD AUTO: 1.66 K/UL — SIGNIFICANT CHANGE UP (ref 1.2–3.4)
LYMPHOCYTES # BLD AUTO: 19.7 % — LOW (ref 20.5–51.1)
MAGNESIUM SERPL-MCNC: 1.8 MG/DL — SIGNIFICANT CHANGE UP (ref 1.8–2.4)
MCHC RBC-ENTMCNC: 29 PG — SIGNIFICANT CHANGE UP (ref 27–31)
MCHC RBC-ENTMCNC: 32.3 G/DL — SIGNIFICANT CHANGE UP (ref 32–37)
MCV RBC AUTO: 90 FL — SIGNIFICANT CHANGE UP (ref 81–99)
MONOCYTES # BLD AUTO: 0.5 K/UL — SIGNIFICANT CHANGE UP (ref 0.1–0.6)
MONOCYTES NFR BLD AUTO: 5.9 % — SIGNIFICANT CHANGE UP (ref 1.7–9.3)
NEUTROPHILS # BLD AUTO: 6.12 K/UL — SIGNIFICANT CHANGE UP (ref 1.4–6.5)
NEUTROPHILS NFR BLD AUTO: 72.9 % — SIGNIFICANT CHANGE UP (ref 42.2–75.2)
NITRITE UR-MCNC: POSITIVE
NRBC # BLD: 0 /100 WBCS — SIGNIFICANT CHANGE UP (ref 0–0)
PH UR: 5.5 — SIGNIFICANT CHANGE UP (ref 5–8)
PLATELET # BLD AUTO: 332 K/UL — SIGNIFICANT CHANGE UP (ref 130–400)
POTASSIUM SERPL-MCNC: 4.3 MMOL/L — SIGNIFICANT CHANGE UP (ref 3.5–5)
POTASSIUM SERPL-SCNC: 4.3 MMOL/L — SIGNIFICANT CHANGE UP (ref 3.5–5)
PROT SERPL-MCNC: 6.9 G/DL — SIGNIFICANT CHANGE UP (ref 6–8)
PROT UR-MCNC: 100 MG/DL
RBC # BLD: 4.48 M/UL — SIGNIFICANT CHANGE UP (ref 4.2–5.4)
RBC # FLD: 12.5 % — SIGNIFICANT CHANGE UP (ref 11.5–14.5)
RBC CASTS # UR COMP ASSIST: ABNORMAL /HPF
SODIUM SERPL-SCNC: 138 MMOL/L — SIGNIFICANT CHANGE UP (ref 135–146)
SP GR SPEC: >=1.03 (ref 1.01–1.03)
UROBILINOGEN FLD QL: 0.2 MG/DL — SIGNIFICANT CHANGE UP
WBC # BLD: 8.41 K/UL — SIGNIFICANT CHANGE UP (ref 4.8–10.8)
WBC # FLD AUTO: 8.41 K/UL — SIGNIFICANT CHANGE UP (ref 4.8–10.8)
WBC UR QL: ABNORMAL /HPF

## 2022-11-23 PROCEDURE — 74177 CT ABD & PELVIS W/CONTRAST: CPT | Mod: 26,MA

## 2022-11-23 PROCEDURE — 99285 EMERGENCY DEPT VISIT HI MDM: CPT

## 2022-11-23 RX ORDER — CEFDINIR 250 MG/5ML
1 POWDER, FOR SUSPENSION ORAL
Qty: 14 | Refills: 0
Start: 2022-11-23 | End: 2022-11-29

## 2022-11-23 RX ORDER — SODIUM CHLORIDE 9 MG/ML
1000 INJECTION, SOLUTION INTRAVENOUS ONCE
Refills: 0 | Status: COMPLETED | OUTPATIENT
Start: 2022-11-23 | End: 2022-11-23

## 2022-11-23 RX ADMIN — SODIUM CHLORIDE 1000 MILLILITER(S): 9 INJECTION, SOLUTION INTRAVENOUS at 10:57

## 2022-11-23 NOTE — ED PROVIDER NOTE - NS ED ATTENDING STATEMENT MOD
This was a shared visit with the PADMINI. I reviewed and verified the documentation and independently performed the documented:

## 2022-11-23 NOTE — ED PROVIDER NOTE - ATTENDING APP SHARED VISIT CONTRIBUTION OF CARE
Patient is a 72-year-old female with few days of nonbloody diarrhea and weakness.  Denies any vomiting.  No fever.    Exam: Cap refill less than 2 seconds, soft nontender abdomen, no acute distress  Plan: Labs, IV fluids, p.o. challenge

## 2022-11-23 NOTE — ED PROVIDER NOTE - OBJECTIVE STATEMENT
patient c/o abdominal pain, intermittent sharp for past several days, no fever, no dysuria, + nausea, + diarrhea at times, no blood in stool, no pain at this time,

## 2022-11-23 NOTE — ED PROVIDER NOTE - PATIENT PORTAL LINK FT
You can access the FollowMyHealth Patient Portal offered by Health system by registering at the following website: [email or web address]. By joining [employer]’s FollowMyHealth portal, you will also be able to view your health information using other applications (apps) compatible with our system.

## 2022-11-26 LAB
-  AMIKACIN: SIGNIFICANT CHANGE UP
-  AMOXICILLIN/CLAVULANIC ACID: SIGNIFICANT CHANGE UP
-  AMPICILLIN/SULBACTAM: SIGNIFICANT CHANGE UP
-  AMPICILLIN: SIGNIFICANT CHANGE UP
-  AZTREONAM: SIGNIFICANT CHANGE UP
-  CEFAZOLIN: SIGNIFICANT CHANGE UP
-  CEFEPIME: SIGNIFICANT CHANGE UP
-  CEFOXITIN: SIGNIFICANT CHANGE UP
-  CEFTRIAXONE: SIGNIFICANT CHANGE UP
-  CIPROFLOXACIN: SIGNIFICANT CHANGE UP
-  ERTAPENEM: SIGNIFICANT CHANGE UP
-  GENTAMICIN: SIGNIFICANT CHANGE UP
-  IMIPENEM: SIGNIFICANT CHANGE UP
-  LEVOFLOXACIN: SIGNIFICANT CHANGE UP
-  MEROPENEM: SIGNIFICANT CHANGE UP
-  NITROFURANTOIN: SIGNIFICANT CHANGE UP
-  PIPERACILLIN/TAZOBACTAM: SIGNIFICANT CHANGE UP
-  TOBRAMYCIN: SIGNIFICANT CHANGE UP
-  TRIMETHOPRIM/SULFAMETHOXAZOLE: SIGNIFICANT CHANGE UP
CULTURE RESULTS: SIGNIFICANT CHANGE UP
METHOD TYPE: SIGNIFICANT CHANGE UP
ORGANISM # SPEC MICROSCOPIC CNT: SIGNIFICANT CHANGE UP
ORGANISM # SPEC MICROSCOPIC CNT: SIGNIFICANT CHANGE UP
SPECIMEN SOURCE: SIGNIFICANT CHANGE UP

## 2023-02-15 ENCOUNTER — APPOINTMENT (OUTPATIENT)
Dept: ORTHOPEDIC SURGERY | Facility: CLINIC | Age: 73
End: 2023-02-15

## 2023-02-15 VITALS — BODY MASS INDEX: 27.6 KG/M2 | HEIGHT: 62 IN | WEIGHT: 150 LBS

## 2023-02-20 ENCOUNTER — APPOINTMENT (OUTPATIENT)
Dept: ORTHOPEDIC SURGERY | Facility: CLINIC | Age: 73
End: 2023-02-20
Payer: MEDICARE

## 2023-02-20 VITALS — WEIGHT: 150 LBS | BODY MASS INDEX: 27.6 KG/M2 | HEIGHT: 62 IN

## 2023-02-20 DIAGNOSIS — Z96.642 PRESENCE OF LEFT ARTIFICIAL HIP JOINT: ICD-10-CM

## 2023-02-20 PROCEDURE — 73521 X-RAY EXAM HIPS BI 2 VIEWS: CPT

## 2023-02-20 PROCEDURE — 99213 OFFICE O/P EST LOW 20 MIN: CPT

## 2023-02-20 RX ORDER — NAPROXEN 500 MG/1
500 TABLET ORAL
Qty: 60 | Refills: 0 | Status: ACTIVE | COMMUNITY
Start: 2023-02-20 | End: 1900-01-01

## 2023-02-20 NOTE — PHYSICAL EXAM
[Left] : left hip [2+] : posterior tibialis pulse: 2+ [] : non-antalgic [FreeTextEntry8] : No tenderness to palpation throughout [FreeTextEntry9] : Good range of motion throughout with no pain [de-identified] : Good strength throughout with no pain

## 2023-02-20 NOTE — HISTORY OF PRESENT ILLNESS
[de-identified] : Patient is a 72-year-old female here for evaluation of left hip injury.  Patient is 2 years status post left total hip replacement by Dr. Orta.  Patient states about 1 month ago she was picking up heavy luggage and going up the stairs when she twisted her left hip.  Patient states she was immediate pain and has been feeling pain in the left hip since then.  Patient has pain when going up and down stairs, moving hip concern directions.  Denies numbness/tingling, denies instability.  Pain worsens with activity.

## 2023-02-20 NOTE — DISCUSSION/SUMMARY
[de-identified] : Discussed x-rays with patient showing that surgical hardware is intact in good position.  Patient has strained left hip.  Discussed treatment options detail including ice/heat, range of motion exercise, anti-inflammatory medication, rest.  Naproxen 500 mg twice daily sent to patient's pharmacy, discussed side effects.  Patient will call if symptoms worsen or change.  Follow-up in 6 to 8 weeks for reevaluation.  Seen on the supervision of Dr. Orta.

## 2023-02-20 NOTE — DATA REVIEWED
[FreeTextEntry1] : X-ray bilateral hips for comparison: No acute fractures, subluxations, dislocations.  Surgical hardware left hip in good position intact.

## 2023-04-04 ENCOUNTER — APPOINTMENT (OUTPATIENT)
Dept: ORTHOPEDIC SURGERY | Facility: CLINIC | Age: 73
End: 2023-04-04

## 2023-06-06 NOTE — ED PROVIDER NOTE - PMH
Anxiety
Scheduled for colonoscopy , one day pre op , bloods sent stat . Discussed with Dr Mckeon.   Preop instructions provided and patient verbalizes understanding.  Labs done and results pending.

## 2023-06-15 NOTE — DISCHARGE NOTE PROVIDER - NSDCHHATTENDCERT_GEN_ALL_CORE
----- Message from Yesenia Davila MD sent at 1/13/2022 11:04 AM CST -----  Please notify the patient of normal results.     Electrophysiology History and Physical    History and physical reviewed and agree with statements and findings with no significant changes. Patient is here today for BIV pacemaker implant and avj ablation.    Chief Complaint   Patient presents with   • Follow-up         Visit Vitals  /80   Pulse 75   Ht 5' 6.5\" (1.689 m)   Wt 68.9 kg (152 lb)   BMI 24.17 kg/m²         Sarah Torre is a 89 year old female patient with a past medical history of CAD s/p CABG (9/21/1998, 9/29/1998) with cath in 2009 with patent grafts, and another cath 9/2016 with patent grafts, hypertension, and GERD.      Patient was first diagnosed with atrial fibrillation in March 2020 when she was being worked up for eye surgery. She wore a Holter monitor in March 2020 that showed persistent atrial fibrillation with controlled rates.      Echo 5/17/2016 revealed ejection fraction of 61%. Most recent echo 02/24/2021 shows LVEF of 50%. Prior to admission, patient was on Metoprolol tartrate 150 mg PO BID and Eliquis 2.5 BID.     She was admitted 10/2020 for planned Tikosyn drug load for symptomatic atrial fibrillation. Patient was chronically anticoagulated with Eliquis 2.5 mg BID, however patient did not meet criteria for low dose Eliquis. Therefore, SG was done and revealed LA thrombus. No Tikosyn was given and patient was sent home on 5 mg Eliquis BID  Follow up SG on 2/22/2021 showed thrombus had resolved and she was admitted for Tikosyn loading - she had QT prolongation on 250 mcg bid and was discharged on 125 mcg bid with eliquis 5 mg bid.  She was DCCV on 2/24/2021.     She was found to have recurrent AF at her office visit 4/22/2022 - she was symptomatic with fatigue and SOB for 2 weeks prior to visit. Tiksoyn was stopped and changed to amiodarone 200 mg daily. She underwent DCCV 5/17/2022     Her Amiodarone was stopped 10/2022 due to reports of hand tremors and alopecia. Continued with Rate control     Last office visit 4/14/23  pt increased metoprolol and consider AVJ with PPM if symptoms do not improve. Tentatively scheduled for 6/21/23     She notes that she has not felt an improvement in symptoms since increasing metoprolol, as she is still symptomatic when in AF with palpitations and fast heart rates. Her rates are fast in office today. She is tolerating metoprolol and Eliquis well without any issues or bleeding issues.            Ejection Fraction   Date Value Ref Range Status   02/24/2021 50 % Final   Normal left ventricular chamber size. Mildly globally decreased left ventricular systolic function. Left ventricular ejection fraction; 50  %.  Mildly increased right ventricular chamber size. Moderately decreased right ventricular systolic function, TAPSE 1.2 cm. FAC=22%.  Severe pulmonary hypertension; RVSP 74 mmHg.  Moderate tricuspid valve regurgitation.  Severe biatrial enlargement.  No pericardial effusion.  KELLY 66.2ml/m2  No prior study available for comparison.     Echo Stress 2/2/22 at [place]  Negative for ischemia     Labs at [place] 11/22/22  Creatinine 1.08  Labs at [place] 9/28/22  AST 15  ALT 16  TSH 4.38        Review of Systems   Constitutional: Negative for malaise/fatigue.   Respiratory: Negative for shortness of breath.    Cardiovascular: Negative for chest pain, palpitations and leg swelling.   Neurological: Negative for dizziness.         Physical Exam  Constitutional:       Appearance: Normal appearance.   HENT:      Head: Normocephalic and atraumatic.   Cardiovascular:      Rate and Rhythm: Tachycardia present. Rhythm irregular.      Comments: Rhythm strip: AF  Pulmonary:      Effort: Pulmonary effort is normal. No respiratory distress.   Musculoskeletal:         General: Normal range of motion.   Neurological:      Mental Status: She is alert and oriented to person, place, and time.   Psychiatric:         Mood and Affect: Mood normal.         Behavior: Behavior normal.         Thought Content: Thought  content normal.         Judgment: Judgment normal.               Assessment & Plan   Anticoagulation with eliquis      Creatinine (mg/dL)   Date Value   05/16/2022 1.13 (H)          AST/SGOT (Units/L)   Date Value   04/15/2014 17          ALT/SGPT (Units/L)   Date Value   04/15/2014 27      Tolerating well without bleeding.  Continue with same dose.        CAD (coronary artery disease) s/p CABG 1998        Hypertension  Controlled.      Atrial fibrillation (CMD)  Date of initial AF (atrial fibrillation) diagnosis:  2020  Paroxysmal, persistent OR chronic:  Persistent  Symptoms, frequency and duration:  SOB and fatigue  LA (left atrial) size (date):  LVEF (left ventricular ejection fraction) (date):  Echo 2016 - LVEF 61%  CHADSVASCs score:  5   (age4, gender, CAD, HTN)  Dates of ablations:  Past chest and abdominal surgeries /dates:  CABG  Prior AA (anti-arrythymic) and reason for discontinuing:  Amiodarone stopped 10/2022 due to tremors and alopecia  Currently on anticoagulation?:   Eliquis  SHD -     NOLA evaluation -   DCCV on 2/24/2021  DCCV on 5/17/2022 on amiodarone     She is in AF in office today.  She notes that she has not felt an improvement in symptoms since increasing metoprolol, as she is still symptomatic when in AF with palpitations and fast heart rates. Her rates are fast in office today.     Discussed the pathophysiology of AF, findings, and options of management. Recommendation at this time is to proceed with AVJ RFA with PPM device implant to control rates and improve symptoms. Pt was amenable to plan.      Plan:     1. Plan for AVJ RFA with PPM device implant  2. Procedure, risks, benefits and alternatives were discussed with patient and they agree to proceed. Consent received in office today  3. Continue with metoprolol and Eliquis  4. Follow up post procedure     On 5/26/2023, Jasper QUIÑONEZ scribed the services personally performed by Dimitry GOINS Sra, MD      The documentation recorded by  the scribe accurately and completely reflects the service(s) I personally performed and the decisions made by me.             My signature below certifies that the above stated patient is homebound and upon completion of the Face-To-Face encounter, has the need for intermittent skilled nursing, physical therapy and/or speech or occupational therapy services in their home for their current diagnosis as outlined in their initial plan of care. These services will continue to be monitored by myself or another physician.

## 2023-09-05 ENCOUNTER — APPOINTMENT (OUTPATIENT)
Dept: ORTHOPEDIC SURGERY | Facility: CLINIC | Age: 73
End: 2023-09-05
Payer: MEDICARE

## 2023-09-05 DIAGNOSIS — Z96.651 PRESENCE OF RIGHT ARTIFICIAL KNEE JOINT: ICD-10-CM

## 2023-09-05 PROCEDURE — 73562 X-RAY EXAM OF KNEE 3: CPT | Mod: 50

## 2023-09-05 PROCEDURE — 99213 OFFICE O/P EST LOW 20 MIN: CPT

## 2023-09-05 NOTE — PHYSICAL EXAM
[Right] : right knee [] : no extensor lag [FreeTextEntry9] : Good range of motion with no pain [de-identified] : Good strength

## 2023-09-05 NOTE — DATA REVIEWED
[FreeTextEntry1] : X-ray bilateral knees for comparison: No acute fractures, subluxations, dislocations.  Surgical hardware intact in good position right knee.  Mild osteoarthritis left knee.

## 2023-09-05 NOTE — REVIEW OF SYSTEMS
[Negative] : Heme/Lymph
LABS:                        16.4   6.60  )-----------( 266      ( 11 Dec 2022 05:30 )             49.5     12-11    136  |  102  |  13  ----------------------------<  103<H>  3.7   |  28  |  0.86    Ca    9.6      11 Dec 2022 05:30    TPro  9.3<H>  /  Alb  3.9  /  TBili  0.6  /  DBili  x   /  AST  19  /  ALT  19  /  AlkPhos  92  12-11    Lipase, Serum: 104 U/L (12.11.22 @ 05:30)   Troponin I, High Sensitivity Result: 6.6: Serial measurements of high sensitivity troponin I (hs TnI) showing rapid           `from: CT Abdomen and Pelvis w/ IV Cont (12.11.22 @ 09:20) >    FINDINGS:  LOWER CHEST: Within normal limits.    LIVER: Within normal limits.  BILE DUCTS: Normal caliber.  GALLBLADDER: Within normal limits.  SPLEEN: Within normal limits.  PANCREAS: Within normal limits.  ADRENALS: Within normal limits.  KIDNEYS/URETERS: Within normal limits.    BLADDER: Within normal limits.  REPRODUCTIVE ORGANS: Prostate is enlarged.    BOWEL: Fluid-filled multiple distended distal small bowel loops up to 3   cm. Collapsed terminal and distal ileum, possibly mildly thickened   without perienteric fat infiltration/edema. No high-grade transition   point identified. Upper limit of normal caliber appendix 6 mm with mild   mucosal wall enhancement, intraluminal fluid and gas, no appendicoliths,   no surrounding periappendiceal fat infiltration or free fluid.  PERITONEUM: No ascites.  VESSELS: Within normal limits.  RETROPERITONEUM/LYMPH NODES: No lymphadenopathy.  ABDOMINAL WALL: Within normal limits.  BONES: Within normal limits.    IMPRESSION:  1.  Fluid filled mildly distended loops of mid/distal small bowel with   collapsed distal/terminal ileum. No transition point identified. May   represent mild enteritis with ileus or early or low-grade small bowel   obstruction.  2.  Borderline dilated appendix without surrounding inflammatory changes.        < end of copied text >

## 2023-09-05 NOTE — HISTORY OF PRESENT ILLNESS
[de-identified] : Patient is a 73-year-old female with history of right total knee replacement by Dr. Bolivar.  Patient states about 10 to 14 days ago she was experiencing mild pain to the right knee with clicking.  Patient states after rest she has been feeling much better over the past week or so without any complaints.  Denies numbness or tingling, denies instability.

## 2023-09-05 NOTE — DISCUSSION/SUMMARY
[de-identified] : Patient is overall doing well at this time, no complaints, x-rays clear.  Advised patient to continue activities as tolerated.  Call if any questions or concerns.  Follow-up as needed

## 2023-09-12 ENCOUNTER — APPOINTMENT (OUTPATIENT)
Dept: ORTHOPEDIC SURGERY | Facility: CLINIC | Age: 73
End: 2023-09-12

## 2023-10-14 NOTE — ASU PATIENT PROFILE, ADULT - NS PRO PT RIGHT SUPPORT PERSON
69 Declines

## 2023-10-23 NOTE — ED PROVIDER NOTE - EYES NEGATIVE STATEMENT, MLM
Vtama Pregnancy And Lactation Text: It is unknown if this medication can cause problems during pregnancy and breastfeeding. no discharge, no irritation, no pain, no redness, and no visual changes.

## 2024-09-14 NOTE — PACU DISCHARGE NOTE - NSPTMEETSDISCHCRITERIADT_GEN_A_CORE
"Nurse Triage SBAR    Is this a 2nd Level Triage? NO    Situation: Back pain     Background: Pt reports \"severe spinal stenosis, since yesterday left leg and hip pain, progressively worse, can't walk without a walker\". Pt reports \"right now not having sharp pain in back\" and states pain originates in left hip. Pt reports she took \"1,000 mg Tylenol around 10 am\". Pt rates pain \"2\" when not bearing weight. \"When I stand on it 9-10\". Pt states \"doesn't feel like anything is broken\".     Assessment: Acute hip and leg pain    Protocol Recommended Disposition:   See HCP Within 4 Hours (Or PCP Triage)    Recommendation:  UC visit within next four hours per protocol.        Does the patient meet one of the following criteria for ADS visit consideration? 16+ years old, with an [facility] PCP     TIP  Providers, please consider if this condition is appropriate for management at one of our Acute and Diagnostic Services sites.     If patient is a good candidate, please use dotphrase <dot>triageresponse and select Refer to ADS to document.    Reason for Disposition   [1] SEVERE pain (e.g., excruciating, unable to do any normal activities) AND [2] not improved after 2 hours of pain medicine    Additional Information   Negative: Looks like a broken bone or dislocated joint (e.g., crooked or deformed)   Negative: Sounds like a life-threatening emergency to the triager   Negative: Followed a hip injury   Negative: Back pain radiating (shooting) into hip   Negative: Leg pain is main symptom   Negative: [1] SEVERE pain (e.g., excruciating, unable to do any normal activities) AND [2] fever   Negative: Can't stand (bear weight) or walk   Negative: [1] Red area or streak AND [2] fever   Negative: Patient sounds very sick or weak to the triager    Protocols used: Hip Pain-A-AH    " 25-Jan-2021 10:30

## 2025-04-22 NOTE — OCCUPATIONAL THERAPY INITIAL EVALUATION ADULT - LIVES WITH, PROFILE
[daughter and grandchildren live upstairs/children

## 2025-05-15 ENCOUNTER — RESULT CHARGE (OUTPATIENT)
Age: 75
End: 2025-05-15

## 2025-05-15 ENCOUNTER — APPOINTMENT (OUTPATIENT)
Dept: ORTHOPEDIC SURGERY | Facility: CLINIC | Age: 75
End: 2025-05-15
Payer: MEDICARE

## 2025-05-15 VITALS — BODY MASS INDEX: 27.6 KG/M2 | HEIGHT: 62 IN | WEIGHT: 150 LBS

## 2025-05-15 DIAGNOSIS — M17.12 UNILATERAL PRIMARY OSTEOARTHRITIS, LEFT KNEE: ICD-10-CM

## 2025-05-15 PROCEDURE — 73562 X-RAY EXAM OF KNEE 3: CPT | Mod: LT

## 2025-05-15 PROCEDURE — 99213 OFFICE O/P EST LOW 20 MIN: CPT | Mod: 25

## 2025-05-15 PROCEDURE — 20610 DRAIN/INJ JOINT/BURSA W/O US: CPT | Mod: LT

## 2025-06-25 ENCOUNTER — NON-APPOINTMENT (OUTPATIENT)
Age: 75
End: 2025-06-25

## 2025-06-26 ENCOUNTER — APPOINTMENT (OUTPATIENT)
Facility: CLINIC | Age: 75
End: 2025-06-26

## 2025-06-27 ENCOUNTER — APPOINTMENT (OUTPATIENT)
Dept: VASCULAR SURGERY | Facility: CLINIC | Age: 75
End: 2025-06-27

## 2025-09-10 ENCOUNTER — APPOINTMENT (OUTPATIENT)
Dept: VASCULAR SURGERY | Facility: CLINIC | Age: 75
End: 2025-09-10

## 2025-09-13 ENCOUNTER — EMERGENCY (EMERGENCY)
Facility: HOSPITAL | Age: 75
LOS: 0 days | Discharge: HOME | End: 2025-09-13
Attending: EMERGENCY MEDICINE
Payer: MEDICARE

## 2025-09-13 VITALS
HEART RATE: 67 BPM | OXYGEN SATURATION: 99 % | SYSTOLIC BLOOD PRESSURE: 176 MMHG | RESPIRATION RATE: 16 BRPM | TEMPERATURE: 98 F | WEIGHT: 149.91 LBS | DIASTOLIC BLOOD PRESSURE: 78 MMHG
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
## 2025-09-13 DIAGNOSIS — R07.89 OTHER CHEST PAIN: ICD-10-CM

## 2025-09-13 DIAGNOSIS — Z98.890 OTHER SPECIFIED POSTPROCEDURAL STATES: Chronic | ICD-10-CM

## 2025-09-13 DIAGNOSIS — Z96.642 PRESENCE OF LEFT ARTIFICIAL HIP JOINT: Chronic | ICD-10-CM

## 2025-09-13 DIAGNOSIS — Z90.710 ACQUIRED ABSENCE OF BOTH CERVIX AND UTERUS: Chronic | ICD-10-CM

## 2025-09-13 LAB
ALBUMIN SERPL ELPH-MCNC: 4.3 G/DL — SIGNIFICANT CHANGE UP (ref 3.5–5.2)
ALP SERPL-CCNC: 103 U/L — SIGNIFICANT CHANGE UP (ref 30–115)
ALT FLD-CCNC: 19 U/L — SIGNIFICANT CHANGE UP (ref 0–41)
ANION GAP SERPL CALC-SCNC: 12 MMOL/L — SIGNIFICANT CHANGE UP (ref 7–14)
AST SERPL-CCNC: 24 U/L — SIGNIFICANT CHANGE UP (ref 0–41)
BASOPHILS # BLD AUTO: 0.08 K/UL — SIGNIFICANT CHANGE UP (ref 0–0.2)
BASOPHILS NFR BLD AUTO: 1.1 % — SIGNIFICANT CHANGE UP (ref 0–2)
BILIRUB SERPL-MCNC: 0.4 MG/DL — SIGNIFICANT CHANGE UP (ref 0.2–1.2)
BUN SERPL-MCNC: 13 MG/DL — SIGNIFICANT CHANGE UP (ref 10–20)
CALCIUM SERPL-MCNC: 9.8 MG/DL — SIGNIFICANT CHANGE UP (ref 8.4–10.5)
CHLORIDE SERPL-SCNC: 105 MMOL/L — SIGNIFICANT CHANGE UP (ref 98–110)
CO2 SERPL-SCNC: 23 MMOL/L — SIGNIFICANT CHANGE UP (ref 17–32)
CREAT SERPL-MCNC: 0.8 MG/DL — SIGNIFICANT CHANGE UP (ref 0.7–1.5)
EGFR: 77 ML/MIN/1.73M2 — SIGNIFICANT CHANGE UP
EGFR: 77 ML/MIN/1.73M2 — SIGNIFICANT CHANGE UP
EOSINOPHIL # BLD AUTO: 0.09 K/UL — SIGNIFICANT CHANGE UP (ref 0–0.5)
EOSINOPHIL NFR BLD AUTO: 1.2 % — SIGNIFICANT CHANGE UP (ref 0–6)
GLUCOSE SERPL-MCNC: 105 MG/DL — HIGH (ref 70–99)
HCT VFR BLD CALC: 39.1 % — SIGNIFICANT CHANGE UP (ref 34.5–45)
HGB BLD-MCNC: 12.8 G/DL — SIGNIFICANT CHANGE UP (ref 11.5–15.5)
IMM GRANULOCYTES # BLD AUTO: 0.02 K/UL — SIGNIFICANT CHANGE UP (ref 0–0.07)
IMM GRANULOCYTES NFR BLD AUTO: 0.3 % — SIGNIFICANT CHANGE UP (ref 0–0.9)
LYMPHOCYTES # BLD AUTO: 2.92 K/UL — SIGNIFICANT CHANGE UP (ref 1–3.3)
LYMPHOCYTES NFR BLD AUTO: 39.3 % — SIGNIFICANT CHANGE UP (ref 13–44)
MCHC RBC-ENTMCNC: 30 PG — SIGNIFICANT CHANGE UP (ref 27–34)
MCHC RBC-ENTMCNC: 32.7 G/DL — SIGNIFICANT CHANGE UP (ref 32–36)
MCV RBC AUTO: 91.6 FL — SIGNIFICANT CHANGE UP (ref 80–100)
MONOCYTES # BLD AUTO: 0.58 K/UL — SIGNIFICANT CHANGE UP (ref 0–0.9)
MONOCYTES NFR BLD AUTO: 7.8 % — SIGNIFICANT CHANGE UP (ref 2–14)
NEUTROPHILS # BLD AUTO: 3.74 K/UL — SIGNIFICANT CHANGE UP (ref 1.8–7.4)
NEUTROPHILS NFR BLD AUTO: 50.3 % — SIGNIFICANT CHANGE UP (ref 43–77)
NRBC # BLD AUTO: 0 K/UL — SIGNIFICANT CHANGE UP (ref 0–0)
NRBC # FLD: 0 K/UL — SIGNIFICANT CHANGE UP (ref 0–0)
NRBC BLD AUTO-RTO: 0 /100 WBCS — SIGNIFICANT CHANGE UP (ref 0–0)
NT-PROBNP SERPL-SCNC: 110 PG/ML — SIGNIFICANT CHANGE UP (ref 0–300)
PLATELET # BLD AUTO: 285 K/UL — SIGNIFICANT CHANGE UP (ref 150–400)
PMV BLD: 10.9 FL — SIGNIFICANT CHANGE UP (ref 7–13)
POTASSIUM SERPL-MCNC: 4.2 MMOL/L — SIGNIFICANT CHANGE UP (ref 3.5–5)
POTASSIUM SERPL-SCNC: 4.2 MMOL/L — SIGNIFICANT CHANGE UP (ref 3.5–5)
PROT SERPL-MCNC: 6.9 G/DL — SIGNIFICANT CHANGE UP (ref 6–8)
RBC # BLD: 4.27 M/UL — SIGNIFICANT CHANGE UP (ref 3.8–5.2)
RBC # FLD: 12.2 % — SIGNIFICANT CHANGE UP (ref 10.3–14.5)
SODIUM SERPL-SCNC: 140 MMOL/L — SIGNIFICANT CHANGE UP (ref 135–146)
TROPONIN T, HIGH SENSITIVITY RESULT: 7 NG/L — SIGNIFICANT CHANGE UP (ref 6–13)
TROPONIN T, HIGH SENSITIVITY RESULT: <6 NG/L — SIGNIFICANT CHANGE UP (ref 6–13)
WBC # BLD: 7.43 K/UL — SIGNIFICANT CHANGE UP (ref 3.8–10.5)
WBC # FLD AUTO: 7.43 K/UL — SIGNIFICANT CHANGE UP (ref 3.8–10.5)

## 2025-09-13 PROCEDURE — 83880 ASSAY OF NATRIURETIC PEPTIDE: CPT

## 2025-09-13 PROCEDURE — 80053 COMPREHEN METABOLIC PANEL: CPT

## 2025-09-13 PROCEDURE — 99285 EMERGENCY DEPT VISIT HI MDM: CPT

## 2025-09-13 PROCEDURE — 99285 EMERGENCY DEPT VISIT HI MDM: CPT | Mod: 25

## 2025-09-13 PROCEDURE — 93005 ELECTROCARDIOGRAM TRACING: CPT

## 2025-09-13 PROCEDURE — 36415 COLL VENOUS BLD VENIPUNCTURE: CPT

## 2025-09-13 PROCEDURE — 84484 ASSAY OF TROPONIN QUANT: CPT

## 2025-09-13 PROCEDURE — 71045 X-RAY EXAM CHEST 1 VIEW: CPT

## 2025-09-13 PROCEDURE — 71045 X-RAY EXAM CHEST 1 VIEW: CPT | Mod: 26

## 2025-09-13 PROCEDURE — 85025 COMPLETE CBC W/AUTO DIFF WBC: CPT

## 2025-09-13 PROCEDURE — 93010 ELECTROCARDIOGRAM REPORT: CPT

## 2025-09-19 ENCOUNTER — APPOINTMENT (OUTPATIENT)
Dept: ORTHOPEDIC SURGERY | Facility: CLINIC | Age: 75
End: 2025-09-19
Payer: MEDICARE

## 2025-09-19 DIAGNOSIS — M17.12 UNILATERAL PRIMARY OSTEOARTHRITIS, LEFT KNEE: ICD-10-CM

## 2025-09-19 PROCEDURE — 99214 OFFICE O/P EST MOD 30 MIN: CPT | Mod: 25

## 2025-09-19 PROCEDURE — 20610 DRAIN/INJ JOINT/BURSA W/O US: CPT | Mod: LT
